# Patient Record
Sex: FEMALE | Race: WHITE | NOT HISPANIC OR LATINO | Employment: FULL TIME | URBAN - METROPOLITAN AREA
[De-identification: names, ages, dates, MRNs, and addresses within clinical notes are randomized per-mention and may not be internally consistent; named-entity substitution may affect disease eponyms.]

---

## 2017-03-23 ENCOUNTER — HOSPITAL ENCOUNTER (OUTPATIENT)
Dept: RADIOLOGY | Facility: HOSPITAL | Age: 37
Discharge: HOME | End: 2017-03-23
Attending: NEUROLOGICAL SURGERY
Payer: COMMERCIAL

## 2017-03-23 DIAGNOSIS — R93.0 ABNORMAL FINDINGS ON DIAGNOSTIC IMAGING OF SKULL AND HEAD, NOT ELSEWHERE CLASSIFIED: ICD-10-CM

## 2017-03-23 PROCEDURE — A9585 GADOBUTROL INJECTION: HCPCS | Performed by: NEUROLOGICAL SURGERY

## 2017-03-23 PROCEDURE — 70553 MRI BRAIN STEM W/O & W/DYE: CPT

## 2017-03-23 RX ADMIN — GADOBUTROL 8 ML: 604.72 INJECTION INTRAVENOUS at 18:52

## 2017-03-28 ENCOUNTER — ALLSCRIPTS OFFICE VISIT (OUTPATIENT)
Dept: OTHER | Facility: OTHER | Age: 37
End: 2017-03-28

## 2017-05-09 ENCOUNTER — APPOINTMENT (EMERGENCY)
Dept: RADIOLOGY | Facility: HOSPITAL | Age: 37
End: 2017-05-09
Payer: COMMERCIAL

## 2017-05-09 ENCOUNTER — HOSPITAL ENCOUNTER (EMERGENCY)
Facility: HOSPITAL | Age: 37
Discharge: HOME | End: 2017-05-09
Attending: EMERGENCY MEDICINE
Payer: COMMERCIAL

## 2017-05-09 VITALS
SYSTOLIC BLOOD PRESSURE: 106 MMHG | TEMPERATURE: 97.3 F | WEIGHT: 200 LBS | BODY MASS INDEX: 29.53 KG/M2 | HEART RATE: 72 BPM | DIASTOLIC BLOOD PRESSURE: 54 MMHG | RESPIRATION RATE: 18 BRPM | OXYGEN SATURATION: 98 %

## 2017-05-09 DIAGNOSIS — Q62.11 HYDRONEPHROSIS WITH URETEROPELVIC JUNCTION (UPJ) OBSTRUCTION: ICD-10-CM

## 2017-05-09 DIAGNOSIS — N20.0 KIDNEY STONE ON RIGHT SIDE: Primary | ICD-10-CM

## 2017-05-09 LAB
ALBUMIN SERPL BCP-MCNC: 4.1 G/DL (ref 3.5–5)
ALP SERPL-CCNC: 56 U/L (ref 46–116)
ALT SERPL W P-5'-P-CCNC: 21 U/L (ref 12–78)
ANION GAP SERPL CALCULATED.3IONS-SCNC: 7 MMOL/L (ref 4–13)
AST SERPL W P-5'-P-CCNC: 17 U/L (ref 5–45)
BACTERIA UR QL AUTO: NORMAL /HPF
BASOPHILS # BLD AUTO: 0.03 THOUSANDS/ΜL (ref 0–0.1)
BASOPHILS NFR BLD AUTO: 0 % (ref 0–1)
BILIRUB SERPL-MCNC: 0.53 MG/DL (ref 0.2–1)
BILIRUB UR QL STRIP: NEGATIVE
BUN SERPL-MCNC: 19 MG/DL (ref 5–25)
CALCIUM SERPL-MCNC: 8.5 MG/DL (ref 8.3–10.1)
CHLORIDE SERPL-SCNC: 104 MMOL/L (ref 100–108)
CLARITY UR: CLEAR
CO2 SERPL-SCNC: 26 MMOL/L (ref 21–32)
COLOR UR: YELLOW
COLOR, POC: NORMAL
CREAT SERPL-MCNC: 0.68 MG/DL (ref 0.6–1.3)
EOSINOPHIL # BLD AUTO: 0.15 THOUSAND/ΜL (ref 0–0.61)
EOSINOPHIL NFR BLD AUTO: 2 % (ref 0–6)
ERYTHROCYTE [DISTWIDTH] IN BLOOD BY AUTOMATED COUNT: 12.7 % (ref 11.6–15.1)
GFR SERPL CREATININE-BSD FRML MDRD: >60 ML/MIN/1.73SQ M
GLUCOSE SERPL-MCNC: 109 MG/DL (ref 65–140)
GLUCOSE UR STRIP-MCNC: NEGATIVE MG/DL
HCG UR QL: NEGATIVE
HCT VFR BLD AUTO: 38.1 % (ref 34.8–46.1)
HGB BLD-MCNC: 12.9 G/DL (ref 11.5–15.4)
HGB UR QL STRIP.AUTO: ABNORMAL
HYALINE CASTS #/AREA URNS LPF: NORMAL /LPF
KETONES UR STRIP-MCNC: NEGATIVE MG/DL
LEUKOCYTE ESTERASE UR QL STRIP: ABNORMAL
LIPASE SERPL-CCNC: 123 U/L (ref 73–393)
LYMPHOCYTES # BLD AUTO: 1.7 THOUSANDS/ΜL (ref 0.6–4.47)
LYMPHOCYTES NFR BLD AUTO: 19 % (ref 14–44)
MCH RBC QN AUTO: 28.4 PG (ref 26.8–34.3)
MCHC RBC AUTO-ENTMCNC: 33.9 G/DL (ref 31.4–37.4)
MCV RBC AUTO: 84 FL (ref 82–98)
MONOCYTES # BLD AUTO: 0.45 THOUSAND/ΜL (ref 0.17–1.22)
MONOCYTES NFR BLD AUTO: 5 % (ref 4–12)
NEUTROPHILS # BLD AUTO: 6.57 THOUSANDS/ΜL (ref 1.85–7.62)
NEUTS SEG NFR BLD AUTO: 74 % (ref 43–75)
NITRITE UR QL STRIP: NEGATIVE
NON-SQ EPI CELLS URNS QL MICRO: NORMAL /HPF
NRBC BLD AUTO-RTO: 0 /100 WBCS
PH UR STRIP.AUTO: 6 [PH] (ref 4.5–8)
PLATELET # BLD AUTO: 246 THOUSANDS/UL (ref 149–390)
PMV BLD AUTO: 9.1 FL (ref 8.9–12.7)
POTASSIUM SERPL-SCNC: 3.5 MMOL/L (ref 3.5–5.3)
PROT SERPL-MCNC: 7.3 G/DL (ref 6.4–8.2)
PROT UR STRIP-MCNC: NEGATIVE MG/DL
RBC # BLD AUTO: 4.54 MILLION/UL (ref 3.81–5.12)
RBC #/AREA URNS AUTO: NORMAL /HPF
SODIUM SERPL-SCNC: 137 MMOL/L (ref 136–145)
SP GR UR STRIP.AUTO: 1.01 (ref 1–1.03)
UROBILINOGEN UR QL STRIP.AUTO: 0.2 E.U./DL
WBC # BLD AUTO: 8.91 THOUSAND/UL (ref 4.31–10.16)
WBC #/AREA URNS AUTO: NORMAL /HPF

## 2017-05-09 PROCEDURE — 81001 URINALYSIS AUTO W/SCOPE: CPT

## 2017-05-09 PROCEDURE — 96374 THER/PROPH/DIAG INJ IV PUSH: CPT

## 2017-05-09 PROCEDURE — 87086 URINE CULTURE/COLONY COUNT: CPT

## 2017-05-09 PROCEDURE — 85025 COMPLETE CBC W/AUTO DIFF WBC: CPT | Performed by: EMERGENCY MEDICINE

## 2017-05-09 PROCEDURE — 36415 COLL VENOUS BLD VENIPUNCTURE: CPT | Performed by: EMERGENCY MEDICINE

## 2017-05-09 PROCEDURE — 81002 URINALYSIS NONAUTO W/O SCOPE: CPT | Performed by: EMERGENCY MEDICINE

## 2017-05-09 PROCEDURE — 83690 ASSAY OF LIPASE: CPT | Performed by: EMERGENCY MEDICINE

## 2017-05-09 PROCEDURE — 81025 URINE PREGNANCY TEST: CPT | Performed by: EMERGENCY MEDICINE

## 2017-05-09 PROCEDURE — 80053 COMPREHEN METABOLIC PANEL: CPT | Performed by: EMERGENCY MEDICINE

## 2017-05-09 PROCEDURE — 74176 CT ABD & PELVIS W/O CONTRAST: CPT

## 2017-05-09 PROCEDURE — 99284 EMERGENCY DEPT VISIT MOD MDM: CPT

## 2017-05-09 RX ORDER — ONDANSETRON 4 MG/1
4 TABLET, ORALLY DISINTEGRATING ORAL ONCE
Status: COMPLETED | OUTPATIENT
Start: 2017-05-09 | End: 2017-05-09

## 2017-05-09 RX ORDER — OXYCODONE HYDROCHLORIDE AND ACETAMINOPHEN 5; 325 MG/1; MG/1
1 TABLET ORAL EVERY 4 HOURS PRN
Qty: 10 TABLET | Refills: 0 | Status: SHIPPED | OUTPATIENT
Start: 2017-05-09 | End: 2017-05-19

## 2017-05-09 RX ORDER — KETOROLAC TROMETHAMINE 30 MG/ML
15 INJECTION, SOLUTION INTRAMUSCULAR; INTRAVENOUS ONCE
Status: COMPLETED | OUTPATIENT
Start: 2017-05-09 | End: 2017-05-09

## 2017-05-09 RX ORDER — NAPROXEN 500 MG/1
500 TABLET ORAL 2 TIMES DAILY WITH MEALS
Qty: 60 TABLET | Refills: 0 | Status: SHIPPED | OUTPATIENT
Start: 2017-05-09 | End: 2018-01-31

## 2017-05-09 RX ORDER — TAMSULOSIN HYDROCHLORIDE 0.4 MG/1
0.4 CAPSULE ORAL
Qty: 5 CAPSULE | Refills: 0 | Status: SHIPPED | OUTPATIENT
Start: 2017-05-09 | End: 2018-01-31

## 2017-05-09 RX ADMIN — KETOROLAC TROMETHAMINE 15 MG: 30 INJECTION, SOLUTION INTRAMUSCULAR at 01:52

## 2017-05-09 RX ADMIN — ONDANSETRON 4 MG: 4 TABLET, ORALLY DISINTEGRATING ORAL at 01:52

## 2017-05-10 LAB — BACTERIA UR CULT: NORMAL

## 2017-08-25 ENCOUNTER — ALLSCRIPTS OFFICE VISIT (OUTPATIENT)
Dept: OTHER | Facility: OTHER | Age: 37
End: 2017-08-25

## 2017-08-25 DIAGNOSIS — Z13.6 ENCOUNTER FOR SCREENING FOR CARDIOVASCULAR DISORDERS: ICD-10-CM

## 2017-08-25 DIAGNOSIS — Z00.00 ENCOUNTER FOR GENERAL ADULT MEDICAL EXAMINATION WITHOUT ABNORMAL FINDINGS: ICD-10-CM

## 2018-01-10 NOTE — RESULT NOTES
Message   Rashad Stacy please have the patient follow-up with me to discuss the MRI of the brain report in the next 1-2 days         Verified Results  * MRI BRAIN W WO CONTRAST [id number] 09:01PM Dayanara Stacy     Test Name Result Flag Reference   MRI BRAIN W [address] [employer] (Report)     This is a summary report  The complete report is available in the patient's medical record  If you cannot access the medical record, please contact the sending organization for a detailed fax or copy  MRI BRAIN WITH AND WITHOUT CONTRAST     INDICATION: Headaches  COMPARISON: 8/10/2016  TECHNIQUE:   Sagittal T1, axial T2, axial FLAIR, axial T1, axial [place], axial diffusion  Sagittal, axial T1 postcontrast  Axial BRAVO post contrast     8 mL of Gadavist was injected intravenously without immediate consequence  IMAGE QUALITY:  Diagnostic  FINDINGS:     BRAIN PARENCHYMA: Once again identified is a faint T2 and FLAIR hyperintensity within the left posterior lateral frontal white matter, series 5 image 19 unchanged from the examination performed one week ago without contrast  No associated abnormal    signal on gradient imaging  Postcontrast imaging is unremarkable as well  Normal corpus callosum  No other white matter lesions are identified  Normal basilar cisterns  Brainstem and cerebellum are unremarkable  VENTRICLES: Normal      SELLA AND PITUITARY GLAND: Normal      ORBITS: Normal      PARANASAL SINUSES: Moderate mucosal thickening of the maxillary sinuses, ethmoid air cells and sphenoid sinus  No change  VASCULATURE: Evaluation of the major intracranial vasculature demonstrates appropriate flow voids  CALVARIUM AND SKULL BASE: Normal      EXTRACRANIAL SOFT TISSUES: Normal        IMPRESSION:       1   The previously described solitary white matter lesion within the left posterior lateral frontal lobe demonstrates no abnormal enhancement after administration of contrast  There is no diffusion abnormality or associated abnormal gradient signal     Findings are nonspecific for a patient of this age and differential possibilities would include solitary focus of demyelinating disease  Low-grade neoplastic process not completely excluded  Consider MRI of the spinal cord to evaluate for additional    demyelinating lesions  Short-term follow-up examination in 3-6 months recommended to confirm stability  ##imslh##imslh     ##fuslh6##fuslh6       Workstation performed: [id number]     Signed by:   Kwadwo Lozano DO   8/29/16       Plan  Abnormal MRI of head    · * MRI BRAIN W WO CONTRAST; Status:Active;  Requested for:58Rvd2558;     Signatures   Electronically signed by : Olena Graf DO; Aug 29 2016  7:58PM EST                       (Author)

## 2018-01-11 NOTE — PROGRESS NOTES
Assessment    1  Encounter for preventive health examination (V70 0) (Z00 00)   2  Anxiety (300 00) (F41 9)    Assessment and plan #1 annual wellness examination completed  Patient overall the patient is clinically stable and doing well, I have recommended that the patient follow a healthy and balanced diet, exercise routinely, recommend weight loss  We encouraged a flu vaccine in the fall and continue to see the OB/GYN for routine examination  I have ordered a comprehensive metabolic panel, lipid panel fasting  #2 anxiety no suicidal ideation I have refilled the lorazepam 0 5 mg 1 tablet once a day as needed no alcohol or driving we did check the PDMP, also I will start the patient on Lexapro 5 mg 1 tablet per day I have reviewed the risks benefits and side effects of the medication I have recommended the patient consider counseling she will let me know in the future is interested RTO in 4-6 weeks call if any problems  Plan  Anxiety    · (Q) TSH, 3RD GENERATION; Status:Active; Requested for:80Bjg7197;   Claustrophobia    · LORazepam 0 5 MG Oral Tablet (Ativan); TAKE 1 TABLET DAILY AS NEEDED  Health Maintenance    · Lexapro 5 MG Oral Tablet (Escitalopram Oxalate); TAKE 1 TABLET DAILY   · (1) COMPREHENSIVE METABOLIC PANEL; Status:Active; Requested for:48Dek0148; Health Maintenance, Encounter for screening for cardiovascular disorders    · (1) LIPID PANEL, FASTING; Status:Active; Requested for:51Ylq2419;     Discussion/Summary  healthy adult female Currently, she eats a healthy diet and has an adequate exercise regimen  the risks and benefits of cervical cancer screening were discussed cervical cancer screening is current Breast cancer screening: monthly self breast exam was advised  Colorectal cancer screening: colorectal cancer screening is not indicated  Osteoporosis screening: bone mineral density testing is not indicated  Screening lab work includes glucose, lipid profile and thyroid function testing   The risks and benefits of immunizations were discussed and Td today, flu shot in the fall  Advice and education were given regarding nutrition and weight loss  Patient discussion: discussed with the patient  History of Present Illness  HM, Adult Female: The patient is being seen for a health maintenance evaluation  Social History: Household members include spouse, 1 daughter(s) and 1 son(s)  She is   Work status: working full time and occupation: executive   The patient has never smoked cigarettes  She reports rare alcohol use  She has never used illicit drugs  General Health: The patient's health since the last visit is described as good  She has regular dental visits  The patient brushes 1 time(s) a day, flosses 1 time(s) a day and reports her last dental visit was 6/2017  She complains of vision problems  Vision care includes wearing glasses, wearing soft contact lenses and an eye examination more than a year ago  She denies hearing loss  Immunizations status: up to date  Lifestyle:  She consumes a diverse and healthy diet  Dietary details include 60-70 ounces of water per day, 1 cups of coffee per day, 0 cans of regular soda per day and cans of diet soda per day  She has weight concerns  She exercises regularly  She exercises 4 times per week for 25-30 minutes per session  Exercise includes aerobic conditioning and strength training  She does not use tobacco  The patient has never smoked cigarettes, has never used smokeless tobacco, has never smoked a pipe and has never smoked cigars  She consumes alcohol  She reports occasional alcohol use and drinking 1 every 12 weeks drinks per week  She typically drinks wine and hard liquor, but no beer consumption  Alcohol concern: The patient has no concerns about alcohol abuse  She denies drug use  She has never used illicit drugs  Reproductive health:  she reports normal menses  she uses contraception  she is sexually active     Screening: Cervical cancer screening includes a pap smear performed last year  Breast cancer screening includes no previous mammogram      Safety elements used: seat belt, safe driving habits, sunscreen, smoke detector and carbon monoxide detector  Risk findings: no passive smoke exposure, no guns at home, no travel to developing areas and no tuberculosis exposure  Anxiety Disorder, NOS (Brief): The patient is being seen for an initial evaluation of anxiety  Symptoms:  anxiety, difficulty concentrating, excessive worry, fatigue, irritability, globus, muscle tension, nervousness and sleep disruption, but no panic attacks, no shaky hands and no sweaty palms  Associated symptoms:  flushing, muscle pain and muscle spasms, but no chest pain, no choking sensation, no diaphoresis, no dizziness, no fainting, no heart palpitations, no racing heart, no hyperventilation, no paresthesia and no tremors  HPI: always on edge , feels always at that point going to crack , immediately jumping on the kids,      Active Problems    1  Abnormal MRI of head (793 0) (R93 0)   2  Abnormal weight gain (783 1) (R63 5)   3  Acute streptococcal pharyngitis (034 0) (J02 0)   4  Anxiety (300 00) (F41 9)   5  Asthma (493 90) (J45 909)   6  Brain lesion (348 89) (G93 9)   7  Chest pain (786 50) (R07 9)   8  Claustrophobia (300 29) (F40 240)   9  Dermatitis (692 9) (L30 9)   10  Encounter for gynecological examination without abnormal finding (V72 31) (Z01 419)   11  Encounter for routine checking of intrauterine contraceptive device (V25 42) (Z30 431)   12  Encounter for routine gynecological examination (V72 31) (Z01 419)   13  Encounter for screening for cardiovascular disorders (V81 2) (Z13 6)   14  Fatigue (780 79) (R53 83)   15  Headache (784 0) (R51)   16  Idiopathic urticaria (708 1) (L50 1)   17  Purpura (287 2) (D69 2)   18  Screening for human papillomavirus (HPV) (V73 81) (Z11 51)   19  Seasonal allergic rhinitis (477 9) (J30 2)   20  Tachycardia (785 0) (R00 0)   21  Weight gain (783 1) (R63 5)    Past Medical History    · History of Age At First Period 16 Years Old (Menarche)   · Asthma (493 90) (J45 909)   · History of Blood Type Rh Negative   · History of Currently Nursing (V24 1)   · Encounter for routine checking of intrauterine contraceptive device (V25 42) (Z30 431)   · History of sciatica (V12 49) (Z86 69)   · History of shortness of breath (V13 89) (Z87 898)   · Normal delivery (650) (O80,Z37  9)   · History of Palpitations (785 1) (R00 2)   · Personal history of supraventricular tachycardia (V12 59) (Z86 79)   · History of Spontaneous , complete, without mention of complication (066 96)  (M80 2)    Surgical History    · History of Catheter Ablation Atrial Supraventricular Tachycardia   · History of Cholecystotomy   · History of Oral Surgery Tooth Extraction    Family History  Mother    · Family history of Diabetes Mellitus (V18 0)   · Family history of Hypothyroidism  Father    · Family history of Diabetes Mellitus (V18 0)   · Family history of malignant neoplasm of kidney (V16 51) (Z80 51)   · Family history of Hyperthyroidism  Maternal Great Grandmother    · Family history of cerebrovascular accident (CVA) (V17 1) (Z82 3)  Maternal Grandfather    · Family history of Adenocarcinoma Of The Skin   · Family history of Prostate Cancer (V16 42)  Paternal Grandfather    · Family history of myocardial infarction (V17 3) (Z82 49)    Social History    · Always uses seat belt   · History of Birth Control Method - Condoms   · Denied: History of Drug Use   · Marital History - Currently    · Never a smoker   · Never Drank Alcohol   · Non-smoker (V49 89) (Z78 9)   · Occupation:    · Presence of (intrauterine) contraceptive device (V45 51) (Z97 5)    Current Meds   1  Cetirizine HCl - 10 MG Oral Tablet; TAKE 1 TABLET DAILY AS DIRECTED; Therapy: (Isra Baeza) to Recorded   2   LORazepam 0 5 MG Oral Tablet; TAKE 1 TABLET DAILY AS NEEDED; Therapy: 19Wlk1273 to (Evaluate:02Sep2016); Last Rx:01Sep2016 Ordered   3  Ventolin  (90 Base) MCG/ACT Inhalation Aerosol Solution; INHALE 1 TO 2   PUFFS EVERY 4 TO 6 HOURS AS NEEDED; Therapy: 15SXA1921 to (Last Rx:33Xir7808)  Requested for: 19Irx2612 Ordered    Allergies    1  Penicillins    2  Animal dander - Cats   3  Dust   4  Mold    Vitals   Recorded: 25Aug2017 09:16AM   Heart Rate 72   Respiration 16   Systolic 375, LUE, Sitting   Diastolic 68, LUE, Sitting   BP CUFF SIZE Large   Height 5 ft 8 in   Weight 200 lb 0 6 oz   BMI Calculated 30 42   BSA Calculated 2 04     Physical Exam    Constitutional   General appearance: No acute distress, well appearing and well nourished  Head and Face   Head and face: Normal     Eyes   Conjunctiva and lids: No swelling, erythema or discharge  Pupils and irises: Equal, round, reactive to light  Ears, Nose, Mouth, and Throat   External inspection of ears and nose: Normal     Otoscopic examination: Tympanic membranes translucent with normal light reflex  Canals patent without erythema  Hearing: Normal     Nasal mucosa, septum, and turbinates: Normal without edema or erythema  Lips, teeth, and gums: Normal, good dentition  Oropharynx: Normal with no erythema, edema, exudate or lesions  Neck   Neck: Supple, symmetric, trachea midline, no masses  Thyroid: Normal, no thyromegaly  Pulmonary   Respiratory effort: No increased work of breathing or signs of respiratory distress  Auscultation of lungs: Clear to auscultation  Cardiovascular   Auscultation of heart: Normal rate and rhythm, normal S1 and S2, no murmurs  Examination of extremities for edema and/or varicosities: Normal     Abdomen   Abdomen: Non-tender, no masses  Liver and spleen: No hepatomegaly or splenomegaly  Lymphatic   Palpation of lymph nodes in neck: No lymphadenopathy      Psychiatric   Mood and affect: Normal        Results/Data  PHQ-2 Adult Depression Screening 22Ane3994 09:24AM User, Ahs     Test Name Result Flag Reference   PHQ-2 Adult Depression Score 0     Over the last two weeks, how often have you been bothered by any of the following problems? Little interest or pleasure in doing things: Not at all - 0  Feeling down, depressed, or hopeless: Not at all - 0   PHQ-2 Adult Depression Screening Negative         Health Management  Asthma   SPIROMETRY W/ BRONCHO- POC; every 1 year; Next Due: 58Gxq5388; Overdue  Screening for human papillomavirus (HPV)   (1) THIN PREP PAP FOLLOW UP WITH IMAGING; every 3 years; Last 05RTM0476; Next  Due: 50ILI0098;  Overdue    Future Appointments    Date/Time Provider Specialty Site   12/13/2017 11:20 AM Cm Martin DO Obstetrics/Gynecology [facility]   11/09/2017 09:30 AM Pablo Fry DO Internal Medicine [facility]     Signatures   Electronically signed by : Nohemi Garsia DO; Aug 27 2017  8:58AM EST                       (Author)

## 2018-01-12 NOTE — RESULT NOTES
Message   Notify the patient the MRI of the cervical spine does show a small left paracentral C5-6 disc herniation with mild canal stenosis but no cord compression or nerve impingement please have the patient follow-up with me to discuss        Verified Results  * MRI CERVICAL SPINE W WO CONTRAST [id number] 04:36PM Sierra Ansari Order Number: [id number]    - Patient Instructions: To schedule this appointment, please contact Central Scheduling at [phone]  Test Name Result Flag Reference   MRI CERVICAL SPINE W [address] (Report)     MRI CERVICAL SPINE WITH AND WITHOUT CONTRAST     INDICATION: Possible demyelinating disease  COMPARISON: None  TECHNIQUE: Sagittal T1, sagittal T2, sagittal inversion recovery, axial 2D merge and axial T2  Sagittal T1 and axial T1 postcontrast     8 mL of Gadavist was administered without immediate consequence  IMAGE QUALITY: Diagnostic  FINDINGS:     ALIGNMENT: Normal alignment of the cervical spine  No compression fracture  No subluxation  No scoliosis  MARROW SIGNAL: Normal marrow signal is identified within the visualized bony structures  No discrete marrow lesion  CERVICAL AND VISUALIZED UPPER THORACIC CORD: Normal signal within the visualized cord  PREVERTEBRAL AND PARASPINAL SOFT TISSUES: Prevertebral and paraspinal soft tissues are unremarkable  VISUALIZED POSTERIOR FOSSA: The visualized posterior fossa demonstrates no abnormal signal      CERVICAL DISC SPACES:        C2-C3: Normal      C3-C4: Normal      C4-C5: Normal      C5-C6: There is a very small left paracentral disc protrusion abutting the ventral aspect of the thecal sac and mildly effacing the CSF space  No cord compression  Mild canal stenosis without foraminal narrowing       C6-C7: Normal      C7-T1: Normal      UPPER THORACIC DISC SPACES: Normal      POSTCONTRAST IMAGING: Normal        IMPRESSION:     Small left paracentral C5-6 disc herniation with mild canal stenosis but no cord compression or discrete nerve impingement         Workstation performed: [id number]     Signed by:   Dariusz Gamez DO   9/16/16       Signatures   Electronically signed by : Ifeoma Fenton DO; Sep 17 2016  9:15AM EST                       (Author)

## 2018-01-12 NOTE — PSYCH
History of Present Illness  Psychotherapy Provided [facility]: Individual Psychotherapy 40 minutes minutes provided today  Goals addressed in session:   Patient struggling with stress and anxiety secondary to issues with her  and her own Medical status   has been primary stressor and  well known to me  Denies any depressive symptoms  Patient verbal and cooperative  HPI - Psych: Patient denies any depressive symptoms  No SI  Very committed to her family  Has extensive support system via family and peers  Anxious about medical work up taking place but managing  Has been losing patience with marriage and has informed  his behavior has to change to continue relationship   Note   Note:   Provided supportive therapy  Reviewed strategies to continue to apply with  for him to take more ownership for his behavior  Reviewed stress mgmt strategies to utilize  Offered additional sessions moving forward  Assessment    1   Anxiety (300 00) (F41 9)    Signatures   Electronically signed by : Jina Zapien LCSW; Aug 31 2016  7:22PM EST                       (Author)

## 2018-01-12 NOTE — RESULT NOTES
Message   Notify the patient the ultrasound of the thyroid showed scattered subcentimeter colloid cyst please have the patient follow up with me as scheduled to discuss the report        Verified Results  US THYROID 10Aug2016 08:45AM Cassie Gay Order Number: [id number]     Test Name Result Flag Reference   US THYROID (Report)     THYROID ULTRASOUND     INDICATION: Abnormal weight gain     COMPARISON: None  TECHNIQUE:  Ultrasound of the thyroid was performed with a high frequency linear transducer in transverse and sagittal planes including volumetric imaging sweeps as well as traditional still imaging technique  FINDINGS:   Right gland: 5 1 x 1 0 x 1 8 cm  Volume = 4 4 mL  Parenchymal echotexture is mildly heterogeneous  Upper pole heterogeneous hypoechoic colloid cyst measuring 0 4 cm  Left gland: 5 7 x 1 2 x 2 0 cm  Volume = 6 5 mL  Parenchymal echotexture is mildly heterogeneous  No dominant nodules  Isthmus: 0 5 cm in AP dimension  No dominant nodules  IMPRESSION:   Normal size thyroid gland with scattered subcentimeter colloid cysts as detailed above, the largest measures 0 4 cm in the upper pole of the right thyroid lobe          Workstation performed: [id number]     Signed by:    Gina Gosselin, DO   8/10/16       Signatures   Electronically signed by : Lucien Szymanski DO; Aug 10 2016  7:37PM EST                       (Author)

## 2018-01-14 VITALS
BODY MASS INDEX: 30.32 KG/M2 | SYSTOLIC BLOOD PRESSURE: 104 MMHG | RESPIRATION RATE: 16 BRPM | WEIGHT: 200.04 LBS | HEART RATE: 72 BPM | DIASTOLIC BLOOD PRESSURE: 68 MMHG | HEIGHT: 68 IN

## 2018-01-15 NOTE — PROGRESS NOTES
Assessment    1  Abnormal MRI of head (793 0) (R93 0)    Plan    · * MRI BRAIN W WO CONTRAST; Status:Need Information - Financial Authorization; Requested for:28Mar2017;    Perform:[facility]; 9961 169; Ordered; For:Abnormal MRI of head; Ordered By:Tony Chopra;   · Follow Up in 2 Years Evaluation and Treatment  Follow-up  Status: Hold For - Scheduling   Requested for: 22JHT3829   Ordered; For: Abnormal MRI of head; Ordered By: Mary Deras Performed:  Due: 02Apr2017    Discussion/Summary    Unchanged abnormality in the high frontal lobe    Neoplasm is less likely given the fact that it has not changed over time  The patient remains asymptomatic  I've recommended continued follow-up in agreement with the radiologist albeit less frequently  Chief Complaint  Patient presents for 6 month folow up with MRI Brain for review  Dx: Abnormal MRI of head       History of Present Illness  Patient is a 29y old female referred to our office by Internal Medicine, Dr Minda Carlton, for evaluation with an abnormal brain MRI  Patient was complaining of headache and an MRI wo contrast was recommended  MRI of the brain was abnormal with concerned of gliosis  A repeat MRI was recommended to rule out tumor, and rule out MS  After reviewing second MRI Dr Rudolph Bruner recommended neurosurgical evaluation  On 9/29/16, patient presented with a 1 month history of headache following a very stressful circumstance in her personal life  She does have a history of rare but severe headaches  It is very likely that this represents the residual of a severe migrainous effect  Alternatively a low-grade tumor or a demyelinating plaque such as is seen and multiple sclerosis could have a similar appearance  These are much less likely given her personal circumstances  In an abundance of caution repeating the study in 6 months time will allow us to exclude further the possibility of an expanding tumor      Today, she offers no complaints at this time  Active Problems    1  Abnormal MRI of head (793 0) (R93 0)   2  Abnormal weight gain (783 1) (R63 5)   3  Acute streptococcal pharyngitis (034 0) (J02 0)   4  Anxiety (300 00) (F41 9)   5  Asthma (493 90) (J45 909)   6  Brain lesion (348 89) (G93 9)   7  Chest pain (786 50) (R07 9)   8  Claustrophobia (300 29) (F40 240)   9  Dermatitis (692 9) (L30 9)   10  Encounter for gynecological examination without abnormal finding (V72 31) (Z01 419)   11  Encounter for routine checking of intrauterine contraceptive device (V25 42) (Z30 431)   12  Encounter for routine gynecological examination (V72 31) (Z01 419)   13  Encounter for screening for cardiovascular disorders (V81 2) (Z13 6)   14  Fatigue (780 79) (R53 83)   15  Headache (784 0) (R51)   16  Idiopathic urticaria (708 1) (L50 1)   17  Purpura (287 2) (D69 2)   18  Screening for human papillomavirus (HPV) (V73 81) (Z11 51)   19  Tachycardia (785 0) (R00 0)   20  Weight gain (783 1) (R63 5)    Past Medical History    1  History of Age At First Period 16 Years Old (Menarche)   2  Asthma (493 90) (J45 909)   3  History of Blood Type Rh Negative   4  History of Currently Nursing (V24 1)   5  Encounter for routine checking of intrauterine contraceptive device (V25 42) (Z30 431)   6  History of sciatica (V12 49) (Z86 69)   7  History of seasonal allergies (V15 09) (Z88 9)   8  History of shortness of breath (V13 89) (Z87 898)   9  Normal delivery (650) (O80,Z37 9)   10  History of Palpitations (785 1) (R00 2)   11  Personal history of supraventricular tachycardia (V12 59) (Z86 79)   12  History of Spontaneous , complete, without mention of complication (319 38)    (O03 9)    The active problems and past medical history were reviewed and updated today  Surgical History    1  History of Catheter Ablation Atrial Supraventricular Tachycardia   2  History of Cholecystotomy   3   History of Oral Surgery Tooth Extraction    The surgical history was reviewed and updated today  Family History  Mother    1  Family history of Diabetes Mellitus (V18 0)   2  Family history of Hypothyroidism  Father    3  Family history of Diabetes Mellitus (V18 0)   4  Family history of malignant neoplasm of kidney (V16 51) (Z80 51)   5  Family history of Hyperthyroidism  Maternal Great Grandmother    6  Family history of cerebrovascular accident (CVA) (V17 1) (Z82 3)  Maternal Grandfather    7  Family history of Adenocarcinoma Of The Skin   8  Family history of Prostate Cancer (A72 04)  Paternal Grandfather    5  Family history of myocardial infarction (V17 3) (Z82 49)    The family history was reviewed and updated today  Social History    · Always uses seat belt   · History of Birth Control Method - Condoms   · Denied: History of Drug Use   · Marital History - Currently    · Never a smoker   · Never Drank Alcohol   · Non-smoker (V49 89) (Z78 9)   · Occupation:    · Presence of (intrauterine) contraceptive device (V45 51) (Z97 5)  The social history was reviewed and updated today  Current Meds   1  LORazepam 0 5 MG Oral Tablet; TAKE 1 TABLET DAILY AS NEEDED; Therapy: 20Aso6747 to (Evaluate:89Xwt3817); Last Rx:61Sdl9083 Ordered   2  Ventolin  (90 Base) MCG/ACT Inhalation Aerosol Solution; INHALE 1 TO 2 PUFFS   EVERY 4 TO 6 HOURS AS NEEDED; Therapy: 40QQN2217 to (Last Rx:97Ydy8623)  Requested for: 68Xpm5859 Ordered   3  Zyrtec 10 MG TABS; TAKE 1 TABLET DAILY AS DIRECTED; Therapy: (Recorded:89Vea5409) to Recorded    The medication list was reviewed and updated today  Allergies    1  Penicillins    2  Animal dander - Cats   3  Dust   4   Mold    Vitals  Vital Signs    Recorded: 41HWL4985 02:12PM   Temperature 98 F   Heart Rate 76   Respiration 16   Systolic 380   Diastolic 73   Height 5 ft 8 in   Weight 198 lb    BMI Calculated 30 11   BSA Calculated 2 04   Pain Scale 0     Physical Exam     Mental Status: Alert and Oriented x3  Memory is intact  Attentive  Speech is articulate and fluent  Knowledge and vocabulary consistent with education  Grossly nonfocal     Cranial Nerve Exam:  2nd cranial nerve: Normal with no noted deficit  3rd, 4th, and 6th cranial nerves: PERRLA and EOMI without later gaze nystagmus  5th cranial nerve: Normal with no noted deficit  7th cranial nerve: Face symmetrical at grimace and at rest   8th cranial nerve: Grossly intact to finger rub bilaterally  11th cranial nerve: Shoulder shrug equal bilaterally  Motor System - Upper Extremities: Muscle strength: 5/5 bilaterally  Muscle tone: Normal bilaterally  Muscle Bulk: Normal Muscle bulk bilaterally  Motor System - Lower Extremities: Muscle strength: 5/5 bilaterally  Muscle tone: Normal bilaterally  Muscle Bulk: Normal Muscle bulk bilaterally  Coordination: Coordination: Finger to nose was normal, heel to knee to shin was normal able to perform rapid alternating movements well bilaterally  Involuntary movements: None   Sensory: Sensation:Sensation grossly intact to light tough and pinprick  Gait and Station: Gait and station: Lakeisha with a normal gait and station  Tandem walk is normal, Heel walk and toe walk intact and without sign of paresis  Constitutional General appearance: No acute distress, well appearing and well nourished  Results/Data  Diagnostic Studies Reviewed Neurosurger [facility]:   I personally reviewed the MRI of the brain in detail with the patient  MRI Review MRI of the brain is reviewed  This demonstrates no changes in the size of a FLAIR sequence abnormality in the high frontal coronal radiata  Health Management  Asthma   SPIROMETRY W/ BRONCHO- POC; every 1 year; Next Due: 95Gsb2554; Overdue  Screening for human papillomavirus (HPV)   (1) THIN PREP PAP FOLLOW UP WITH IMAGING; every 3 years; Last 30WQK1628; Next Due:  16POL0344;  Overdue    Future Appointments    Date/Time Provider Specialty Site   12/13/2017 11:20 AM Karen Wellington DO Obstetrics/Gynecology [facility]   05/11/2017 04:00 PM Deepak Steele DO Internal Medicine [facility]     Signatures   Electronically signed by : ANASTASIIA Carreno ; Mar 28 2017  3:03PM EST                       (Author)

## 2018-01-15 NOTE — RESULT NOTES
Message   Notify the patient normal Lyme Western blot follow up as scheduled        Verified Results  (1) LYME ANTIBODY, WESTERN BLOT 97Esl1201 01:11PM Author Credit Order Number: [id number]     Test Name Result Flag Reference   LYME 18 KD IGG Absent     LYME 23 KD IGG Absent     LYME 28 KD IGG Absent     LYME 30 KD IGG Absent     LYME 39 KD IGG Absent     LYME 41 KD IGG Absent     LYME 45 KD IGG Absent     LYME 58 KD IGG Absent     LYME 66 KD IGG Absent     LYME 93 KD IGG Absent     LYME 23 KD IGM Absent     LYME 39 KD IGM Absent     LYME 41 KD IGM Absent     LYME IGG WB INTERP  Negative     Positive: 5 of the following                                 Borrelia-specific bands:                                 18,23,28,30,39,41,45,58,                                 66, and 93  Negative: No bands or banding                                 patterns which do not                                 meet positive criteria  LYME IGM WB INTERP  Negative     Note: An equivocal or positive EIA result followed by a negative  Western Blot result is considered NEGATIVE  An equivocal or positive  EIA result followed by a positive Western Blot is considered POSITIVE  by the CDC  Positive: 2 of the following bands: 23,39 or 41  Negative: No bands or banding patterns which do not meet positive  criteria  Criteria for positivity are those recommended by CDC/ASTPHLD   p23=Osp C, l19=mlcbycizb  Note:  Sera from individuals with the following may cross react in the  Lyme Western Blot assays: other spirochetal diseases (periodontal  disease, leptospirosis, relapsing fever, yaws, and pinta);  connective autoimmune (Rheumatoid Arthritis and Systemic Lupus  Erythematosus and also individuals with Antinuclear Antibody);  other infections [facility] Spotted Fever; Hunter-Barr Virus,  and Cytomegalovirus)      Performed at:  [address] [place] [address]  [id number]  Lab Director: Amari Dodson MD, Phone:  [phone]       Signatures   Electronically signed by : Lucien Szymanski DO; Aug 21 2016  8:31AM EST                       (Author)

## 2018-01-17 ENCOUNTER — ALLSCRIPTS OFFICE VISIT (OUTPATIENT)
Dept: OTHER | Facility: OTHER | Age: 38
End: 2018-01-17

## 2018-01-17 DIAGNOSIS — Z13.6 ENCOUNTER FOR SCREENING FOR CARDIOVASCULAR DISORDERS: ICD-10-CM

## 2018-01-17 DIAGNOSIS — Z00.00 ENCOUNTER FOR GENERAL ADULT MEDICAL EXAMINATION WITHOUT ABNORMAL FINDINGS: ICD-10-CM

## 2018-01-17 NOTE — RESULT NOTES
Message   Notify the patient thyroid antibody panel negative follow up as scheduled        Verified Results  (1) THYROID ANTIBODIES PANEL 10Wlo4770 01:11PM Jesse Curtis Order Number: [id number]     Test Name Result Flag Reference   THYROGLOB AB <1 0 IU/mL  0 0 - 0 9   Thyroglobulin Antibody measured by [facility] Methodology    Performed at:  [facility] [address]  [id number]  : Ilya Avila MD, Phone:  [phone]   [facility] 7 IU/mL  0 - 34   Performed at:  [facility] [address]  [id number]  : Ilya Avila MD, Phone:  [phone]       Signatures   Electronically signed by : Rhianna Linder DO; Aug 22 2016  7:48PM EST                       (Author)

## 2018-01-17 NOTE — RESULT NOTES
Message   Gibson Larose please schedule this patient this week regarding abnormal MRI of the brain *************** she will need another MRI with IV contrast enhancement of the brain, I will order this at the visit        Verified Results  * MRI BRAIN WO CONTRAST [id number] 09:19AM Kary Number     Test Name Result Flag Reference   MRI BRAIN WO CONTRAST (Report)     This is a summary report  The complete report is available in the patient's medical record  If you cannot access the medical record, please contact the sending organization for a detailed fax or copy  MRI BRAIN WITHOUT CONTRAST     INDICATION: 23-year-old female, daily headaches for approximately 2 months   COMPARISON:  None  TECHNIQUE: Sagittal T1, axial T2, axial FLAIR, axial T1, axial Haugen and axial diffusion imaging  IMAGE QUALITY: Diagnostic  FINDINGS:   A solitary focus of faint T2 and FLAIR hyperintensity measuring approximately 9 5 mm is present within the deep white matter at the left frontoparietal junction  Possible etiologies include solitary focus of MS or Lyme disease, chronic gliosis related    to remote insult  Solitary tumor focus not excluded  Further diagnostic specificity should be provided by IV contrast-enhanced MRI  BRAIN PARENCHYMA: There is no mass effect or midline shift  No additional abnormal white matter signal identified  Brainstem and cerebellum demonstrate normal signal  There is no intracranial hemorrhage  There is no evidence of acute infarction and    diffusion imaging is unremarkable  VENTRICLES: The ventricles are normal in size and contour  SELLA AND PITUITARY GLAND: Normal      ORBITS: Normal      PARANASAL SINUSES: Normal      VASCULATURE: Evaluation of the major intracranial vasculature demonstrates appropriate flow voids       CALVARIUM AND SKULL BASE: Normal      EXTRACRANIAL SOFT TISSUES: Normal        IMPRESSION:   Approximately 9 5 mm left frontoparietal junction deep white matter T2 and FLAIR hyperintense focus  Chronic gliosis related to remote insult, MS, Lyme disease or solitary tumor focus considered possible etiologies   Further evaluation via IV    contrast-enhanced MRI recommended       No additional intracranial abnormalities identified       ##imslh##imslh       Workstation performed: [id number]     Signed by:   Marry Tamayo MD   8/10/16       Signatures   Electronically signed by : Massimo Crisostomo DO; Aug 10 2016  5:33PM EST                       (Author)

## 2018-01-22 VITALS
SYSTOLIC BLOOD PRESSURE: 131 MMHG | BODY MASS INDEX: 30.01 KG/M2 | DIASTOLIC BLOOD PRESSURE: 73 MMHG | RESPIRATION RATE: 16 BRPM | WEIGHT: 198 LBS | TEMPERATURE: 98 F | HEART RATE: 76 BPM | HEIGHT: 68 IN

## 2018-01-22 NOTE — PROGRESS NOTES
Assessment   1  Anxiety (300 00) (F41 9)   2  Brain lesion (348 89) (G93 9)   3  Weight gain (783 1) (R63 5)   4  Obesity (278 00) (E66 9)      Assessment and plan 1  Anxiety no suicidal ideation I have refilled the patient's Lexapro 10 mg once a day, I have counseled patient, I will have the patient meet with Maricruz Keita is seeking 2  Brain lesion currently stable reviewed the MRI, reviewed neurosurgery letter currently the patient is stable MRIs are being monitored through neurosurgery 3  Obesity/weight gain secondary to diet I have counseled patient at length about following healthy imbalance diet reducing carbohydrates and sweets  Because of her anxiety she has been over eating  4   Asthma currently stable p r n  Ventolin Return to office 4-6 months  call if any problems       Plan   Health Maintenance    · Escitalopram Oxalate 10 MG Oral Tablet; Take 1 tablet daily   · (1) COMPREHENSIVE METABOLIC PANEL; Status:Active; Requested QID:92JFO1028; Health Maintenance, Encounter for screening for cardiovascular disorders    · (1) LIPID PANEL, FASTING; Status:Active; Requested FTF:53TUU5952;     Chief Complaint   Chief Complaint Chronic Condition [facility] [place]: Patient is here today for follow up of chronic conditions described in HPI  History of Present Illness   HPI: [age]-year old female coming in for a follow up office visit regarding abnormal brain MRI, asthma, obesity, in anxiety; the patient reports me that reports the lexapro is taking the edge anxiety and depression ; at home with  angry, the pt reports anxiety because of the situation depressed because of the anx; eating carb no si no hi the patient does report me that her  has bipolar disorder he is under the care of Psychiatry but she feels he is overmedicated, he is like a walking is on via home she has not been going to the psychiatric appointments with her    Because of this she feels very anxious and depressed because his situation at home  She is planning to go to a visit with her   She reports me over eating and eating a wrong foods because of the stress at home  She reports me that work is not stressful and that she enjoys going to work  She reports me that after her  takes the p m  meds he can't communicate or walk at times, 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      Review of Systems   Complete-Female:      Constitutional: No fever, no chills, feels well, no tiredness, no recent weight gain or weight loss  Cardiovascular: No complaints of slow heart rate, no fast heart rate, no chest pain, no palpitations, no leg claudication, no lower extremity edema  Respiratory: No complaints of shortness of breath, no wheezing, no cough, no SOB on exertion, no orthopnea, no PND  Gastrointestinal: No complaints of abdominal pain, no constipation, no nausea or vomiting, no diarrhea, no bloody stools  Genitourinary: No complaints of dysuria, no incontinence, no pelvic pain, no dysmenorrhea, no vaginal discharge or bleeding  ROS Reviewed:    ROS reviewed  Active Problems   1  Abnormal MRI of head (793 0) (R93 0)   2  Abnormal weight gain (783 1) (R63 5)   3  Acute streptococcal pharyngitis (034 0) (J02 0)   4  Anxiety (300 00) (F41 9)   5  Asthma (493 90) (J45 909)   6  Brain lesion (348 89) (G93 9)   7  Chest pain (786 50) (R07 9)   8  Claustrophobia (300 29) (F40 240)   9  Dermatitis (692 9) (L30 9)   10  Encounter for gynecological examination without abnormal finding (V72 31) (Z01 419)   11  Encounter for routine checking of intrauterine contraceptive device (V25 42) (Z30 431)   12  Encounter for routine gynecological examination (V72 31) (Z01 419)   13  Encounter for screening for cardiovascular disorders (V81 2) (Z13 6)   14  Fatigue (780 79) (R53 83)   15  Headache (784 0) (R51)   16  Idiopathic urticaria (708 1) (L50 1)   17  Need for TD vaccine (V06 5) (Z23)   18   Purpura (287  2) (D69 2)   19  Screening for human papillomavirus (HPV) (V73 81) (Z11 51)   20  Seasonal allergic rhinitis (477 9) (J30 2)   21  Tachycardia (785 0) (R00 0)   22  Weight gain (783 1) (R63 5)    Past Medical History   1  History of Age At First Period 16 Years Old (Menarche)   2  Asthma (493 90) (J45 909)   3  History of Blood Type Rh Negative   4  History of Currently Nursing (V24 1)   5  Encounter for routine checking of intrauterine contraceptive device (V25 42) (Z30 431)   6  History of sciatica (V12 49) (Z86 69)   7  History of shortness of breath (V13 89) (Z87 898)   8  Normal delivery (650) (O80,Z37 9)   9  History of Palpitations (785 1) (R00 2)   10  Personal history of supraventricular tachycardia (V12 59) (Z86 79)   11  History of Spontaneous , complete, without mention of complication (234 79) (B16 8)  Active Problems And Past Medical History Reviewed: The active problems and past medical history were reviewed and updated today  Surgical History   1  History of Catheter Ablation Atrial Supraventricular Tachycardia   2  History of Cholecystotomy   3  History of Oral Surgery Tooth Extraction  Surgical History Reviewed: The surgical history was reviewed and updated today  Family History   Mother    1  Family history of Diabetes Mellitus (V18 0)   2  Family history of Hypothyroidism  Father    3  Family history of Diabetes Mellitus (V18 0)   4  Family history of malignant neoplasm of kidney (V16 51) (Z80 51)   5  Family history of Hyperthyroidism  Maternal Great Grandmother    6  Family history of cerebrovascular accident (CVA) (V17 1) (Z82 3)  Maternal Grandfather    7  Family history of Adenocarcinoma Of The Skin   8  Family history of Prostate Cancer (Z32 88)  Paternal Grandfather    5  Family history of myocardial infarction (V17 3) (Z82 49)  Family History Reviewed: The family history was reviewed and updated today         Social History    · Always uses seat belt   · History of Birth Control Method - Condoms   · Denied: History of Drug Use   · Marital History - Currently    · Never a smoker   · Never Drank Alcohol   · Non-smoker (V49 89) (Z78 9)   · Occupation:    · Presence of (intrauterine) contraceptive device (V45 51) (Z97 5)  Social History Reviewed: The social history was reviewed and updated today  The social history was reviewed and is unchanged  Current Meds    1  Cetirizine HCl - 10 MG Oral Tablet; TAKE 1 TABLET DAILY AS DIRECTED; Therapy: (Lugenia Moat) to Recorded   2  Lexapro 5 MG Oral Tablet; TAKE 1 TABLET DAILY; Therapy: 88Tgt4545 to (Evaluate:27Mar2018)  Requested for: 09OFR9134; Last Rx:27Nov2017     Ordered   3  LORazepam 0 5 MG Oral Tablet; TAKE 1 TABLET DAILY AS NEEDED; Therapy: 59Pyz9789 to (Evaluate:25Izi3141); Last Rx:08Anv5807 Ordered   4  Ventolin  (90 Base) MCG/ACT Inhalation Aerosol Solution; INHALE 1 TO 2 PUFFS EVERY 4     TO 6 HOURS AS NEEDED; Therapy: 29TQF6015 to (Last Rx:47Yzq6033)  Requested for: 41Gia9931 Ordered  Medication List Reviewed: The medication list was reviewed and updated today  Allergies   1  Penicillins  2  Animal dander - Cats   3  Dust   4  Mold    Vitals   Vital Signs    Recorded: 12WOT7471 04:37PM   Heart Rate 80   Respiration 16   Systolic 403, RUE, Sitting   Diastolic 82, RUE, Sitting   Height 5 ft 8 in   Weight 213 lb 0 6 oz   BMI Calculated 32 39   BSA Calculated 2 1   O2 Saturation 98     Physical Exam        Constitutional      General appearance: No acute distress, well appearing and well nourished  Eyes      Conjunctiva and lids: No swelling, erythema or discharge  Pupils and irises: Equal, round and reactive to light  Ears, Nose, Mouth, and Throat      External inspection of ears and nose: Normal        Otoscopic examination: Tympanic membranes translucent with normal light reflex  Canals patent without erythema         Nasal mucosa, septum, and turbinates: Normal without edema or erythema  Oropharynx: Normal with no erythema, edema, exudate or lesions  Pulmonary      Respiratory effort: No increased work of breathing or signs of respiratory distress  Auscultation of lungs: Clear to auscultation  Cardiovascular      Auscultation of heart: Normal rate and rhythm, normal S1 and S2, without murmurs  Examination of extremities for edema and/or varicosities: Normal        Abdomen      Abdomen: Non-tender, no masses  Liver and spleen: No hepatomegaly or splenomegaly  Lymphatic      Palpation of lymph nodes in neck: No lymphadenopathy  Psychiatric      Mood and affect: Abnormal   Mood and Affect: anxious-- and-- depressed  Health Management   Asthma   SPIROMETRY W/ BRONCHO- POC; every 1 year; Next Due: 24Wge1243; Overdue  Screening for human papillomavirus (HPV)   (1) THIN PREP PAP FOLLOW UP WITH IMAGING; every 3 years; Last 73JGU3079; Next Due:    47QGF4993;  Overdue    Signatures    Electronically signed by : Juan Robles DO; Jan 21 2018 12:22PM EST                       (Author)

## 2018-01-23 VITALS
DIASTOLIC BLOOD PRESSURE: 82 MMHG | HEIGHT: 68 IN | BODY MASS INDEX: 32.29 KG/M2 | OXYGEN SATURATION: 98 % | WEIGHT: 213.04 LBS | SYSTOLIC BLOOD PRESSURE: 138 MMHG | RESPIRATION RATE: 16 BRPM | HEART RATE: 80 BPM

## 2018-01-23 NOTE — PSYCH
History of Present Illness  Psychotherapy Provided [facility]: Individual Psychotherapy 25 minutes minutes provided today  Goals addressed in session:   Daphnie Singh struggling with stress and anxiety secondary to menatl health status of  well known to me  He is doing very well throughout day, working and spending time with family  However, takes his PM meds and can't function to point he slumps over in chair, can't communicate or walk at times  In addition, she has two children so this leaves little time for herself  Doing well at work  She is pleasant, verbal and cooperative  HPI - Psych: Daphnie Singh reports her mood is stable  Overall, has been very happy with how  has been doing and improvements in how he relates to her and children  However, his struggles in evening have been anxiety producing  Deals with additional family stressors but managing   Note   Note:   Encouraged her to attend 's next appt with psychiatrist  Will reach out to him and communicate issues to his psychiatrist after  Reviewed stress Mgmt strategies for her to employ  Assessment    1  Anxiety (300 00) (F41 9)    Plan   1  (1) COMPREHENSIVE METABOLIC PANEL; Status:Active; Requested YCQ:04FTI5143;    2  (1) LIPID PANEL, FASTING; Status:Active; Requested CPL:51YUP2584;    3  Escitalopram Oxalate 10 MG Oral Tablet;  Take 1 tablet daily    Signatures   Electronically signed by : Moises Sanchez LCSW; Jan 17 2018  6:12PM EST                       (Author)

## 2018-01-29 ENCOUNTER — TELEPHONE (OUTPATIENT)
Dept: INTERNAL MEDICINE CLINIC | Facility: CLINIC | Age: 38
End: 2018-01-29

## 2018-01-30 ENCOUNTER — TELEPHONE (OUTPATIENT)
Dept: INTERNAL MEDICINE CLINIC | Facility: CLINIC | Age: 38
End: 2018-01-30

## 2018-01-31 ENCOUNTER — OFFICE VISIT (OUTPATIENT)
Dept: INTERNAL MEDICINE CLINIC | Facility: CLINIC | Age: 38
End: 2018-01-31
Payer: COMMERCIAL

## 2018-01-31 VITALS
WEIGHT: 215.6 LBS | SYSTOLIC BLOOD PRESSURE: 124 MMHG | DIASTOLIC BLOOD PRESSURE: 76 MMHG | OXYGEN SATURATION: 98 % | HEART RATE: 86 BPM | TEMPERATURE: 99.3 F | RESPIRATION RATE: 16 BRPM | HEIGHT: 69 IN | BODY MASS INDEX: 31.93 KG/M2

## 2018-01-31 DIAGNOSIS — J01.10 ACUTE FRONTAL SINUSITIS, RECURRENCE NOT SPECIFIED: Primary | ICD-10-CM

## 2018-01-31 DIAGNOSIS — R51.9 HEADACHE ABOVE THE EYE REGION: ICD-10-CM

## 2018-01-31 DIAGNOSIS — F41.9 ANXIETY: ICD-10-CM

## 2018-01-31 PROCEDURE — 99213 OFFICE O/P EST LOW 20 MIN: CPT | Performed by: NURSE PRACTITIONER

## 2018-01-31 PROCEDURE — 3008F BODY MASS INDEX DOCD: CPT | Performed by: NURSE PRACTITIONER

## 2018-01-31 RX ORDER — LORAZEPAM 0.5 MG/1
1 TABLET ORAL DAILY PRN
COMMUNITY
Start: 2016-08-25 | End: 2018-05-12 | Stop reason: ALTCHOICE

## 2018-01-31 RX ORDER — DOXYCYCLINE 100 MG/1
100 CAPSULE ORAL 2 TIMES DAILY
Qty: 14 CAPSULE | Refills: 0 | Status: SHIPPED | OUTPATIENT
Start: 2018-01-31 | End: 2018-02-07

## 2018-01-31 RX ORDER — ALBUTEROL SULFATE 90 UG/1
1-2 AEROSOL, METERED RESPIRATORY (INHALATION)
COMMUNITY
Start: 2012-10-15

## 2018-01-31 RX ORDER — ESCITALOPRAM OXALATE 10 MG/1
1 TABLET ORAL DAILY
COMMUNITY
Start: 2017-08-25 | End: 2018-04-25 | Stop reason: SDUPTHER

## 2018-01-31 NOTE — PROGRESS NOTES
Assessment/Plan:     Diagnoses and all orders for this visit:    Acute frontal sinusitis, recurrence not specified  -     doxycycline monohydrate (MONODOX) 100 mg capsule; Take 1 capsule (100 mg total) by mouth 2 (two) times a day for 7 days    Headache above the eye region    Anxiety  Comments:  ok to resume lexapro 10 mg daily  call for increasing/worsening headache  follow up with Dr Tiffany Edwards as scheduled for April    Other orders  -     albuterol (VENTOLIN HFA) 90 mcg/act inhaler; Inhale 1-2 puffs  -     LORazepam (ATIVAN) 0 5 mg tablet; Take 1 tablet by mouth daily as needed  -     escitalopram (LEXAPRO) 10 mg tablet; Take 1 tablet by mouth daily          Subjective:      Patient ID: Josie Hewitt is a 40 y o  female  URI about 3 weeks ago  Recently started on lexapro 5mg by Dr Prince Liu on 1/18  He increased dose to 10 mg last week  A few days after starting 10 mg dose, she developed a headache so she called into office and was advised to stop lexapro and get evaluated if headache continues  She still has a headache, some congestion, frontal maxillary tenderness and left ear popping  Headache relieved by ibuprofen           No family history on file  Past Medical History:   Diagnosis Date    Asthma     SV[facility]ra[facility]cardia) ([facility])      Social History     Social History    Marital status: /Civil Union     Spouse name: N/A    Number of children: N/A    Years of education: N/A     Occupational History    Not on file       Social History Main Topics    Smoking status: Never Smoker    Smokeless tobacco: Never Used    Alcohol use No    Drug use: No    Sexual activity: Not on file     Other Topics Concern    Not on file     Social History Narrative    No narrative on file       Current Outpatient Prescriptions:     albuterol (VENTOLIN HFA) 90 mcg/act inhaler, Inhale 1-2 puffs, Disp: , Rfl:     doxycycline monohydrate (MONODOX) 100 mg capsule, Take 1 capsule (100 mg total) by mouth 2 (two) times a day for 7 days, Disp: 14 capsule, Rfl: 0    escitalopram (LEXAPRO) 10 mg tablet, Take 1 tablet by mouth daily, Disp: , Rfl:     LORazepam (ATIVAN) 0 5 mg tablet, Take 1 tablet by mouth daily as needed, Disp: , Rfl:   Allergies   Allergen Reactions    Cat Hair Extract     Dust Mite Extract     Molds & Smuts     Penicillins Rash     The following portions of the patient's history were reviewed and updated as appropriate: allergies, current medications, past family history, past medical history, past social history, past surgical history and problem list     Review of Systems   Constitutional: Negative for fever  HENT: Positive for congestion, sinus pain and sinus pressure  Negative for sore throat  Eyes: Negative for visual disturbance  Respiratory: Negative for cough, chest tightness and shortness of breath  Cardiovascular: Negative for chest pain and palpitations  Gastrointestinal: Negative for diarrhea, nausea and vomiting  Musculoskeletal: Negative for myalgias  Neurological: Positive for headaches  Psychiatric/Behavioral: The patient is nervous/anxious  Objective:     Physical Exam   Constitutional: She is oriented to person, place, and time  She appears well-developed and well-nourished  HENT:   Right Ear: External ear normal    Left Ear: External ear normal    Nose: Mucosal edema present  Right sinus exhibits frontal sinus tenderness  Left sinus exhibits frontal sinus tenderness  Mouth/Throat: Oropharynx is clear and moist    Eyes: Pupils are equal, round, and reactive to light  Neck: Neck supple  Cardiovascular: Normal rate, regular rhythm and normal heart sounds  Pulmonary/Chest: Effort normal and breath sounds normal    Lymphadenopathy:     She has no cervical adenopathy  Neurological: She is alert and oriented to person, place, and time  Psychiatric: She has a normal mood and affect  Her behavior is normal    Vitals reviewed        Vitals: 01/31/18 1135   BP: 124/76   Pulse: 86   Resp: 16   Temp: 99 3 °F (37 4 °C)   SpO2: 98%

## 2018-01-31 NOTE — LETTER
January 31, 2018     Patient: Gonzalo Delatorre   YOB: 1980   Date of Visit: 1/31/2018       To Whom it May Concern:    Renetta Osuna is under my professional care  She was seen in my office on 1/31/2018  She may return to work on 1/31/2018  If you have any questions or concerns, please don't hesitate to call           Sincerely,          JOHAN Munoz        CC: No Recipients

## 2018-01-31 NOTE — PATIENT INSTRUCTIONS
Rhinosinusitis   AMBULATORY CARE:   Rhinosinusitis (RS)  is inflammation of your nose and sinuses  It commonly begins as a virus, often as a common cold  Viruses usually last 7 to 10 days and do not need treatment  When the virus does not get better on its own, you may have bacterial RS  This means that bacteria have begun to grow inside your sinuses  Acute RS lasts less than 4 weeks  Chronic RS lasts 12 weeks or more  Recurrent RS is when you have 4 or more episodes of RS in one year  Your signs and symptoms  may be worse when you lie on your back or try to sleep  You may have any of the following:  · Stuffy nose and reduced sense of smell     · Runny nose with thick yellow or green mucus     · Pressure or pain on your face or a headache     · Pain in your teeth or bad breath     · Ear pain or pressure     · Fever or cough     · Tiredness  Seek care immediately if:   · You have double vision or you cannot see  · You have a stiff neck, a fever, or a bad headache  · Your eyeball bulges out or you cannot move your eye  · Your eye and eyelid are red, swollen, and painful  · You cannot open your eye  · You are more sleepy than normal, or you notice changes in your ability to think, move, or talk  · You have swelling of your forehead or scalp  Contact your healthcare provider if:   · Your symptoms are worse or do not improve after 3 to 5 days of treatment  · You have questions or concerns about your condition or care  Treatment for rhinosinusitis  may include any of the following:  · Acetaminophen  decreases pain and fever  It is available without a doctor's order  Ask how much to take and how often to take it  Follow directions  Acetaminophen can cause liver damage if not taken correctly  · NSAIDs , such as ibuprofen, help decrease swelling, pain, and fever  This medicine is available with or without a doctor's order   NSAIDs can cause stomach bleeding or kidney problems in certain people  If you take blood thinner medicine, always ask your healthcare provider if NSAIDs are safe for you  Always read the medicine label and follow directions  · Nasal steroid sprays  decrease inflammation in your nose and sinuses  · Decongestants  reduce swelling and drain mucus in the nose and sinuses  They may help you breathe easier  · Antihistamines  dry mucus in the nose and relieve sneezing  · Antibiotics  treat a bacterial infection and may be needed if your symptoms do not improve or they get worse  · Take your medicine as directed  Contact your healthcare provider if you think your medicine is not helping or if you have side effects  Tell him or her if you are allergic to any medicine  Keep a list of the medicines, vitamins, and herbs you take  Include the amounts, and when and why you take them  Bring the list or the pill bottles to follow-up visits  Carry your medicine list with you in case of an emergency  Self-care:   · Rinse your sinuses  Use a sinus rinse device to rinse your nasal passages with a saline (salt water) solution  This will help thin the mucus in your nose and rinse away pollen and dirt  It will also help reduce swelling so you can breathe normally  Ask your healthcare provider how often to do this  · Breathe in steam   Heat a bowl of water until you see steam  Lean over the bowl and make a tent over your head with a large towel  Breathe deeply for about 20 minutes  Be careful not to get too close to the steam or burn yourself  Do this 3 times a day  You can also breathe deeply when you take a hot shower  · Sleep with your head elevated  Place an extra pillow under your head before you go to sleep to help your sinuses drain  · Drink liquids as directed  Ask your healthcare provider how much liquid to drink each day and which liquids are best for you  Liquids will thin the mucus in your nose and help it drain   Avoid drinks that contain alcohol or caffeine  · Do not smoke, and avoid secondhand smoke  Nicotine and other chemicals in cigarettes and cigars can make your symptoms worse  Ask your healthcare provider for information if you currently smoke and need help to quit  E-cigarettes or smokeless tobacco still contain nicotine  Talk to your healthcare provider before you use these products  Follow up with your healthcare provider as directed: Follow up if your symptoms are worse or not better after 3 to 5 days of treatment  Write down your questions so you remember to ask them during your visits  © 2017 [employer] Information is for End User's use only and may not be sold, redistributed or otherwise used for commercial purposes  All illustrations and images included in CareNotes® are the copyrighted property of [employer]  or [employer]  The above information is an  only  It is not intended as medical advice for individual conditions or treatments  Talk to your doctor, nurse or pharmacist before following any medical regimen to see if it is safe and effective for you

## 2018-04-04 ENCOUNTER — ANNUAL EXAM (OUTPATIENT)
Dept: OBGYN CLINIC | Facility: CLINIC | Age: 38
End: 2018-04-04
Payer: COMMERCIAL

## 2018-04-04 VITALS
HEIGHT: 68 IN | SYSTOLIC BLOOD PRESSURE: 116 MMHG | BODY MASS INDEX: 33.52 KG/M2 | WEIGHT: 221.2 LBS | DIASTOLIC BLOOD PRESSURE: 70 MMHG

## 2018-04-04 DIAGNOSIS — Z01.419 ENCOUNTER FOR GYNECOLOGICAL EXAMINATION WITHOUT ABNORMAL FINDING: Primary | ICD-10-CM

## 2018-04-04 DIAGNOSIS — R63.5 WEIGHT GAIN: ICD-10-CM

## 2018-04-04 PROCEDURE — S0612 ANNUAL GYNECOLOGICAL EXAMINA: HCPCS | Performed by: OBSTETRICS & GYNECOLOGY

## 2018-04-04 PROCEDURE — 58301 REMOVE INTRAUTERINE DEVICE: CPT | Performed by: OBSTETRICS & GYNECOLOGY

## 2018-04-04 NOTE — PROGRESS NOTES
Assessment/Plan:    No problem-specific Assessment & Plan notes found for this encounter  Diagnoses and all orders for this visit:    Encounter for gynecological examination without abnormal finding        Annual examination was completed  IUD was removed without difficulty  Patient was counseled that fertility will return immediately  She will use condoms for contraception  She will update me in the next 3-6 months if she would desire replacement  Patient to return in 1 year  Subjective:      Patient ID: Pollo Hansen is a 40 y o  female  Patient returns  for annual examination  She has no new medical surgical issues  She has desire to have IUD removed as she has had difficulty with inability to lose weight  She has been amenorrheic  She will use condoms for contraception  Gynecologic Exam         The following portions of the patient's history were reviewed and updated as appropriate:   She  has a past medical history of Asthma and SVT (supraventricular tachycardia) ([facility] [address] )  She   Patient Active Problem List    Diagnosis Date Noted    Encounter for gynecological examination without abnormal finding 04/04/2018    Anxiety 08/19/2016    Headache 07/14/2016    Asthma 12/10/2012     She  has a past surgical history that includes Cholecystectomy  Her family history includes Diabetes in her father and mother; [facility]' disease in her father; Hypertension in her mother; Kidney cancer in her father; Thyroid disease in her mother  She  reports that she has never smoked  She has never used smokeless tobacco  She reports that she does not drink alcohol or use drugs    Current Outpatient Prescriptions   Medication Sig Dispense Refill    albuterol (VENTOLIN HFA) 90 mcg/act inhaler Inhale 1-2 puffs      escitalopram (LEXAPRO) 10 mg tablet Take 1 tablet by mouth daily      LORazepam (ATIVAN) 0 5 mg tablet Take 1 tablet by mouth daily as needed       No current facility-administered medications for this visit  Current Outpatient Prescriptions on File Prior to Visit   Medication Sig    albuterol (VENTOLIN HFA) 90 mcg/act inhaler Inhale 1-2 puffs    escitalopram (LEXAPRO) 10 mg tablet Take 1 tablet by mouth daily    LORazepam (ATIVAN) 0 5 mg tablet Take 1 tablet by mouth daily as needed     No current facility-administered medications on file prior to visit  She is allergic to cat hair extract; dust mite extract; molds & smuts; and penicillins       Review of Systems   All other systems reviewed and are negative  Objective:      /70 (BP Location: Left arm, Patient Position: Sitting, Cuff Size: Standard)   Ht 5' 8" (1 727 m)   Wt 100 kg (221 lb 3 2 oz)   BMI 33 63 kg/m²          Physical Exam   Constitutional: She is oriented to person, place, and time  She appears well-developed and well-nourished  HENT:   Head: Normocephalic and atraumatic  Nose: Nose normal    Eyes: EOM are normal  Pupils are equal, round, and reactive to light  Neck: Normal range of motion  Neck supple  No thyromegaly present  Cardiovascular: Normal rate and regular rhythm  Pulmonary/Chest: Effort normal and breath sounds normal  No respiratory distress  Breasts no masses, tenderness, skin changes   Abdominal: Soft  Bowel sounds are normal  She exhibits no distension and no mass  There is no tenderness  Hernia confirmed negative in the right inguinal area and confirmed negative in the left inguinal area  Genitourinary: Vagina normal and uterus normal  No breast swelling, tenderness, discharge or bleeding  Pelvic exam was performed with patient supine  No labial fusion  There is no rash, tenderness, lesion or injury on the right labia  There is no rash, tenderness, lesion or injury on the left labia  Cervix exhibits no motion tenderness, no discharge and no friability     Genitourinary Comments: Ext genitalia nl female w/o lesions  Cervix healthy w/o lesions or discharge; IUD strings noted  uterus nl size, NT, no mass  Adnexa NT, no mass   Musculoskeletal: Normal range of motion  She exhibits no edema  Lymphadenopathy:        Right: No inguinal adenopathy present  Left: No inguinal adenopathy present  Neurological: She is alert and oriented to person, place, and time  She has normal reflexes  Skin: Skin is warm and dry  No rash noted  Psychiatric: She has a normal mood and affect  Her behavior is normal  Thought content normal    Nursing note and vitals reviewed      Iud removal  Date/Time: 4/4/2018 8:59 AM  Performed by: Shelah Severance  Authorized by: Shelah Severance     Consent:     Consent obtained:  Verbal    Consent given by:  Patient    Procedure risks and benefits discussed: yes      Patient questions answered: yes      Patient agrees, verbalizes understanding, and wants to proceed: yes      Educational handouts given: no      Instructions and paperwork completed: no    Procedure:     Removed with no complications: yes      Removal due to mechanical complications of IUD: no      Removal due to infection and inflammatory reaction: no      Other reason for removal:  Inability to lose weight

## 2018-04-25 DIAGNOSIS — F41.9 ANXIETY: Primary | ICD-10-CM

## 2018-04-25 RX ORDER — ESCITALOPRAM OXALATE 10 MG/1
TABLET ORAL DAILY
Qty: 90 TABLET | Refills: 0 | Status: SHIPPED | OUTPATIENT
Start: 2018-04-25 | End: 2018-05-12 | Stop reason: ALTCHOICE

## 2018-05-12 ENCOUNTER — APPOINTMENT (EMERGENCY)
Dept: CT IMAGING | Facility: HOSPITAL | Age: 38
End: 2018-05-12
Payer: COMMERCIAL

## 2018-05-12 ENCOUNTER — HOSPITAL ENCOUNTER (EMERGENCY)
Facility: HOSPITAL | Age: 38
Discharge: HOME | End: 2018-05-12
Attending: EMERGENCY MEDICINE
Payer: COMMERCIAL

## 2018-05-12 VITALS
SYSTOLIC BLOOD PRESSURE: 138 MMHG | DIASTOLIC BLOOD PRESSURE: 77 MMHG | HEART RATE: 78 BPM | BODY MASS INDEX: 33.41 KG/M2 | HEIGHT: 68 IN | RESPIRATION RATE: 18 BRPM | WEIGHT: 220.46 LBS | TEMPERATURE: 98 F | OXYGEN SATURATION: 99 %

## 2018-05-12 DIAGNOSIS — N23 RENAL COLIC ON LEFT SIDE: Primary | ICD-10-CM

## 2018-05-12 LAB
ALBUMIN SERPL BCP-MCNC: 4.2 G/DL (ref 3.5–5)
ALP SERPL-CCNC: 57 U/L (ref 46–116)
ALT SERPL W P-5'-P-CCNC: 23 U/L (ref 12–78)
ANION GAP SERPL CALCULATED.3IONS-SCNC: 9 MMOL/L (ref 4–13)
AST SERPL W P-5'-P-CCNC: 14 U/L (ref 5–45)
BACTERIA UR QL AUTO: ABNORMAL /HPF
BASOPHILS # BLD AUTO: 0.05 THOUSANDS/ΜL (ref 0–0.1)
BASOPHILS NFR BLD AUTO: 1 % (ref 0–1)
BILIRUB SERPL-MCNC: 0.6 MG/DL (ref 0.2–1)
BILIRUB UR QL STRIP: NEGATIVE
BUN SERPL-MCNC: 21 MG/DL (ref 5–25)
CALCIUM SERPL-MCNC: 9.3 MG/DL (ref 8.3–10.1)
CHLORIDE SERPL-SCNC: 103 MMOL/L (ref 100–108)
CLARITY UR: CLEAR
CO2 SERPL-SCNC: 27 MMOL/L (ref 21–32)
COLOR UR: YELLOW
CREAT SERPL-MCNC: 0.84 MG/DL (ref 0.6–1.3)
EOSINOPHIL # BLD AUTO: 0.22 THOUSAND/ΜL (ref 0–0.61)
EOSINOPHIL NFR BLD AUTO: 3 % (ref 0–6)
ERYTHROCYTE [DISTWIDTH] IN BLOOD BY AUTOMATED COUNT: 13.2 % (ref 11.6–15.1)
EXT PREG TEST URINE: NEGATIVE
GFR SERPL CREATININE-BSD FRML MDRD: 89 ML/MIN/1.73SQ M
GLUCOSE SERPL-MCNC: 98 MG/DL (ref 65–140)
GLUCOSE UR STRIP-MCNC: NEGATIVE MG/DL
HCT VFR BLD AUTO: 40.8 % (ref 34.8–46.1)
HGB BLD-MCNC: 13.8 G/DL (ref 11.5–15.4)
HGB UR QL STRIP.AUTO: ABNORMAL
KETONES UR STRIP-MCNC: NEGATIVE MG/DL
LEUKOCYTE ESTERASE UR QL STRIP: ABNORMAL
LYMPHOCYTES # BLD AUTO: 2.48 THOUSANDS/ΜL (ref 0.6–4.47)
LYMPHOCYTES NFR BLD AUTO: 33 % (ref 14–44)
MCH RBC QN AUTO: 28.6 PG (ref 26.8–34.3)
MCHC RBC AUTO-ENTMCNC: 33.8 G/DL (ref 31.4–37.4)
MCV RBC AUTO: 85 FL (ref 82–98)
MONOCYTES # BLD AUTO: 0.59 THOUSAND/ΜL (ref 0.17–1.22)
MONOCYTES NFR BLD AUTO: 8 % (ref 4–12)
NEUTROPHILS # BLD AUTO: 4.05 THOUSANDS/ΜL (ref 1.85–7.62)
NEUTS SEG NFR BLD AUTO: 55 % (ref 43–75)
NITRITE UR QL STRIP: NEGATIVE
NON-SQ EPI CELLS URNS QL MICRO: ABNORMAL /HPF
NRBC BLD AUTO-RTO: 0 /100 WBCS
PH UR STRIP.AUTO: 6 [PH] (ref 4.5–8)
PLATELET # BLD AUTO: 259 THOUSANDS/UL (ref 149–390)
PMV BLD AUTO: 9 FL (ref 8.9–12.7)
POTASSIUM SERPL-SCNC: 3.8 MMOL/L (ref 3.5–5.3)
PROT SERPL-MCNC: 7.5 G/DL (ref 6.4–8.2)
PROT UR STRIP-MCNC: NEGATIVE MG/DL
RBC # BLD AUTO: 4.82 MILLION/UL (ref 3.81–5.12)
RBC #/AREA URNS AUTO: ABNORMAL /HPF
SODIUM SERPL-SCNC: 139 MMOL/L (ref 136–145)
SP GR UR STRIP.AUTO: 1.01 (ref 1–1.03)
UROBILINOGEN UR QL STRIP.AUTO: 0.2 E.U./DL
WBC # BLD AUTO: 7.42 THOUSAND/UL (ref 4.31–10.16)
WBC #/AREA URNS AUTO: ABNORMAL /HPF

## 2018-05-12 PROCEDURE — 74176 CT ABD & PELVIS W/O CONTRAST: CPT

## 2018-05-12 PROCEDURE — 36415 COLL VENOUS BLD VENIPUNCTURE: CPT | Performed by: PHYSICIAN ASSISTANT

## 2018-05-12 PROCEDURE — 96361 HYDRATE IV INFUSION ADD-ON: CPT

## 2018-05-12 PROCEDURE — 81001 URINALYSIS AUTO W/SCOPE: CPT | Performed by: PHYSICIAN ASSISTANT

## 2018-05-12 PROCEDURE — 96374 THER/PROPH/DIAG INJ IV PUSH: CPT

## 2018-05-12 PROCEDURE — 81025 URINE PREGNANCY TEST: CPT | Performed by: PHYSICIAN ASSISTANT

## 2018-05-12 PROCEDURE — 80053 COMPREHEN METABOLIC PANEL: CPT | Performed by: PHYSICIAN ASSISTANT

## 2018-05-12 PROCEDURE — 99284 EMERGENCY DEPT VISIT MOD MDM: CPT

## 2018-05-12 PROCEDURE — 85025 COMPLETE CBC W/AUTO DIFF WBC: CPT | Performed by: PHYSICIAN ASSISTANT

## 2018-05-12 RX ORDER — KETOROLAC TROMETHAMINE 30 MG/ML
30 INJECTION, SOLUTION INTRAMUSCULAR; INTRAVENOUS ONCE
Status: COMPLETED | OUTPATIENT
Start: 2018-05-12 | End: 2018-05-12

## 2018-05-12 RX ADMIN — KETOROLAC TROMETHAMINE 30 MG: 30 INJECTION, SOLUTION INTRAMUSCULAR at 09:03

## 2018-05-12 RX ADMIN — SODIUM CHLORIDE 1000 ML: 0.9 INJECTION, SOLUTION INTRAVENOUS at 08:55

## 2018-05-12 NOTE — DISCHARGE INSTRUCTIONS
Renal Colic   WHAT YOU NEED TO KNOW:   Renal colic is severe pain in your lower back or sides  The pain is usually on one side, but may be on both sides of your lower back  Renal colic may start quickly, come and go, and become worse over time  Renal colic is caused by a blockage in your urinary tract  The most common cause of a blockage is a kidney stone  Blood clots, ureter spasms, and dead tissue may also block your urinary tract  DISCHARGE INSTRUCTIONS:   Return to the emergency department if:   · You cannot stop vomiting  · You see new or increased bleeding when you urinate  · You are urinating less than usual, or not at all  · Your pain is not getting better even after treatment  Contact your healthcare provider if:   · You have fever  · You need to urinate more often than usual, or right away  · You see a stone in your urine strainer after you urinate  · You have questions or concerns about your condition or care  Medicines:   · Medicines  may help decrease pain and muscle spasms  You may also need medicine to calm your stomach and stop vomiting  · Take your medicine as directed  Contact your healthcare provider if you think your medicine is not helping or if you have side effects  Tell him of her if you are allergic to any medicine  Keep a list of the medicines, vitamins, and herbs you take  Include the amounts, and when and why you take them  Bring the list or the pill bottles to follow-up visits  Carry your medicine list with you in case of an emergency  Manage your symptoms:   · Drink liquids as directed  to help decrease pain and flush blockages from your urinary tract  Ask how much liquid to drink each day and which liquids are best for you  You may need to drink about 3 liters (12 glasses) of liquids each day  Half of your total daily liquids should be water  Limit coffee, tea, and soda to 2 cups daily  Your urine should be pale and clear      · Strain your urine every time you urinate  Urinate into a strainer (funnel with a fine mesh on the bottom) or glass jar to collect kidney stones  Give the kidney stones to your healthcare provider at your next visit  · Eat a variety of healthy foods  Healthy foods include fruits, vegetables, whole-grain breads, low-fat dairy products, beans, lean meats, and fish  You may need to increase the amount of citrus fruit you eat, such as oranges  Ask your healthcare provider how much salt, calcium, and protein you should eat  · Avoid activity in hot temperatures  Heat may cause you to become dehydrated and urinate less  Follow up with your healthcare provider as directed: You may need to return for tests to check if your blockage has cleared  Write down your questions so you remember to ask them during your visits  © 2017 [employer] Information is for End User's use only and may not be sold, redistributed or otherwise used for commercial purposes  All illustrations and images included in CareNotes® are the copyrighted property of [employer]  or [employer]  The above information is an  only  It is not intended as medical advice for individual conditions or treatments  Talk to your doctor, nurse or pharmacist before following any medical regimen to see if it is safe and effective for you

## 2018-05-12 NOTE — ED PROVIDER NOTES
History  Chief Complaint   Patient presents with    Flank Pain     Patient c/o left sided flank pain along with left sided abdominal pain  Patient denies nausea and vomitting  60-year-old female presents to the emergency department with complaints of left lower quadrant abdominal pain  States that has developed over the past 20 minutes to an hour and now radiates to the left flank  States she has a history of kidney stones approximately 1 year ago but knows that she had additional stones that were seen higher up at the time  Denies nausea or vomiting at this time  No urinary symptoms  History provided by:  Patient   used: No    Flank Pain   Pain location:  LLQ and L flank  Pain quality: cramping and sharp    Pain severity:  Moderate  Onset quality:  Gradual  Duration:  1 hour  Timing:  Constant  Progression:  Waxing and waning  Chronicity:  New  Relieved by:  Nothing  Ineffective treatments:  None tried  Associated symptoms: no anorexia, no belching, no chest pain, no chills, no constipation, no cough, no diarrhea, no dysuria, no fatigue, no fever, no flatus, no hematemesis, no hematochezia, no hematuria, no melena, no nausea, no shortness of breath, no sore throat, no vaginal bleeding, no vaginal discharge and no vomiting        Prior to Admission Medications   Prescriptions Last Dose Informant Patient Reported? Taking?    albuterol (VENTOLIN HFA) 90 mcg/act inhaler   Yes Yes   Sig: Inhale 1-2 puffs      Facility-Administered Medications: None       Past Medical History:   Diagnosis Date    Asthma     Kidney stones     SVT (supraventricular tachycardia) (HCC)        Past Surgical History:   Procedure Laterality Date    ATRIAL ABLATION SURGERY      CHOLECYSTECTOMY      2006       Family History   Problem Relation Age of Onset    Diabetes Mother     Hypertension Mother     Thyroid disease Mother     Diabetes Father     Kidney cancer Father    Ivy Billings' disease Father I have reviewed and agree with the history as documented  Social History   Substance Use Topics    Smoking status: Never Smoker    Smokeless tobacco: Never Used    Alcohol use No        Review of Systems   Constitutional: Negative for activity change, appetite change, chills, fatigue and fever  HENT: Negative for congestion, dental problem, drooling, ear discharge, ear pain, mouth sores, nosebleeds, rhinorrhea, sore throat and trouble swallowing  Eyes: Negative for pain, discharge and itching  Respiratory: Negative for cough, chest tightness, shortness of breath and wheezing  Cardiovascular: Negative for chest pain and palpitations  Gastrointestinal: Negative for abdominal pain, anorexia, blood in stool, constipation, diarrhea, flatus, hematemesis, hematochezia, melena, nausea and vomiting  Endocrine: Negative for cold intolerance and heat intolerance  Genitourinary: Positive for flank pain  Negative for difficulty urinating, dysuria, frequency, hematuria, urgency, vaginal bleeding and vaginal discharge  Skin: Negative for rash and wound  Allergic/Immunologic: Negative for food allergies and immunocompromised state  Neurological: Negative for dizziness, seizures, syncope, weakness, numbness and headaches  Psychiatric/Behavioral: Negative for agitation, behavioral problems and confusion         Physical Exam  ED Triage Vitals   Temperature Pulse Respirations Blood Pressure SpO2   05/12/18 0848 05/12/18 0848 05/12/18 0848 05/12/18 0848 05/12/18 0848   98 °F (36 7 °C) 77 18 149/71 96 %      Temp src Heart Rate Source Patient Position - Orthostatic VS BP Location FiO2 (%)   -- 05/12/18 0848 05/12/18 0848 05/12/18 0848 --    Monitor Sitting Right arm       Pain Score       05/12/18 0903       7           Orthostatic Vital Signs  Vitals:    05/12/18 0848 05/12/18 0940 05/12/18 1006   BP: 149/71 138/77 138/77   Pulse: 77 68 78   Patient Position - Orthostatic VS: Sitting Lying Lying Physical Exam   Constitutional: She is oriented to person, place, and time  She appears well-developed and well-nourished  No distress  HENT:   Head: Normocephalic and atraumatic  Right Ear: External ear normal    Left Ear: External ear normal    Mouth/Throat: Oropharynx is clear and moist  No oropharyngeal exudate  Eyes: Conjunctivae are normal    Neck: No JVD present  No tracheal deviation present  Cardiovascular: Normal rate, regular rhythm and normal heart sounds  Exam reveals no gallop and no friction rub  No murmur heard  Pulmonary/Chest: Effort normal and breath sounds normal  No respiratory distress  She has no wheezes  She has no rales  She exhibits no tenderness  Abdominal: Soft  Bowel sounds are normal  She exhibits no distension  There is tenderness in the left lower quadrant  There is no guarding and no CVA tenderness  Musculoskeletal: Normal range of motion  She exhibits no edema, tenderness or deformity  Lymphadenopathy:     She has no cervical adenopathy  Neurological: She is alert and oriented to person, place, and time  Skin: Skin is warm and dry  No rash noted  She is not diaphoretic  No erythema  Psychiatric: She has a normal mood and affect  Her behavior is normal    Nursing note and vitals reviewed        ED Medications  Medications   sodium chloride 0 9 % bolus 1,000 mL (1,000 mL Intravenous New Bag 5/12/18 0855)   ketorolac (TORADOL) injection 30 mg (30 mg Intravenous Given 5/12/18 0903)       Diagnostic Studies  Results Reviewed     Procedure Component Value Units Date/Time    POCT pregnancy, urine [[id number]]  (Normal) Resulted:  05/12/18 1005    Lab Status:  Final result Updated:  05/12/18 1005     EXT PREG TEST UR (Ref: Negative) negative    Urine Microscopic [[id number]]  (Abnormal) Collected:  05/12/18 0938    Lab Status:  Final result Specimen:  Urine from Urine, Clean Catch Updated:  05/12/18 0956     RBC, UA 4-10 (A) /hpf      WBC, UA 2-4 (A) /hpf Epithelial Cells Occasional /hpf      Bacteria, UA Occasional /hpf     UA w Reflex to Microscopic w Reflex to Culture [[id number]]  (Abnormal) Collected:  05/12/18 0938    Lab Status:  Final result Specimen:  Urine from Urine, Clean Catch Updated:  05/12/18 0949     Color, UA Yellow     Clarity, UA Clear     Specific Gravity, UA 1 010     pH, UA 6 0     Leukocytes, UA Small (A)     Nitrite, UA Negative     Protein, UA Negative mg/dl      Glucose, UA Negative mg/dl      Ketones, UA Negative mg/dl      Urobilinogen, UA 0 2 E U /dl      Bilirubin, UA Negative     Blood, UA Moderate (A)    Comprehensive metabolic panel [[id number]] Collected:  05/12/18 0857    Lab Status:  Final result Specimen:  Blood from Arm, Right Updated:  05/12/18 9270     Sodium 139 mmol/L      Potassium 3 8 mmol/L      Chloride 103 mmol/L      CO2 27 mmol/L      Anion Gap 9 mmol/L      BUN 21 mg/dL      Creatinine 0 84 mg/dL      Glucose 98 mg/dL      Calcium 9 3 mg/dL      AST 14 U/L      ALT 23 U/L      Alkaline Phosphatase 57 U/L      Total Protein 7 5 g/dL      Albumin 4 2 g/dL      Total Bilirubin 0 60 mg/dL      eGFR 89 ml/min/1 73sq m     Narrative:         National Kidney Disease Education Program recommendations are as follows:  GFR calculation is accurate only with a steady state creatinine  Chronic Kidney disease less than 60 ml/min/1 73 sq  meters  Kidney failure less than 15 ml/min/1 73 sq  meters      CBC and differential [[id number]] Collected:  05/12/18 0857    Lab Status:  Final result Specimen:  Blood from Arm, Right Updated:  05/12/18 0902     WBC 7 42 Thousand/uL      RBC 4 82 Million/uL      Hemoglobin 13 8 g/dL      Hematocrit 40 8 %      MCV 85 fL      MCH 28 6 pg      MCHC 33 8 g/dL      RDW 13 2 %      MPV 9 0 fL      Platelets 799 Thousands/uL      nRBC 0 /100 WBCs      Neutrophils Relative 55 %      Lymphocytes Relative 33 %      Monocytes Relative 8 %      Eosinophils Relative 3 %      Basophils Relative 1 %      Neutrophils Absolute 4 05 Thousands/µL      Lymphocytes Absolute 2 48 Thousands/µL      Monocytes Absolute 0 59 Thousand/µL      Eosinophils Absolute 0 22 Thousand/µL      Basophils Absolute 0 05 Thousands/µL                  CT renal stone study abdomen pelvis without contrast   Final Result by Raoul Lazo MD (05/12 1059)         1  Punctate nonobstructing calculus within the upper pole the right kidney  2   No calculi seen on the left  Workstation performed: [id number]                    Procedures  Procedures       Phone Contacts  ED Phone Contact    ED Course  ED Course as of May 12 1101   Sat May 12, 2018   1045 Patient without pain at this time  Believes that she may have passed a kidney stone in the urine cup  Will wait for read on CT                                 MDM  Number of Diagnoses or Management Options  Renal colic on left side:   Diagnosis management comments: Differential diagnosis includes but not limited to:  Kidney stone, hydronephrosis, urinary tract infection, pyelonephritis  Amount and/or Complexity of Data Reviewed  Clinical lab tests: ordered and reviewed  Tests in the radiology section of CPT®: ordered and reviewed  Independent visualization of images, tracings, or specimens: yes      CritCare Time    Disposition  Final diagnoses:   Renal colic on left side     Time reflects when diagnosis was documented in both MDM as applicable and the Disposition within this note     Time User Action Codes Description Comment    5/12/2018 11:01 AM Prieto Perez [C68] Renal colic on left side       ED Disposition     ED Disposition Condition Comment    Discharge  Evern Dine discharge to home/self care      Condition at discharge: Stable        Follow-up Information     Follow up With Specialties Details Why Diana Chavez U  51  For Urology [facility] Schedule an appointment as soon as possible for a visit  [address]  [address] [facility] [address]  [phone]        Patient's Medications   Discharge Prescriptions    No medications on file     No discharge procedures on file      ED Provider  Electronically Signed by           Nacho Levi PA-C  05/12/18 2872

## 2018-08-28 ENCOUNTER — TELEPHONE (OUTPATIENT)
Dept: OBGYN CLINIC | Facility: CLINIC | Age: 38
End: 2018-08-28

## 2018-08-28 NOTE — TELEPHONE ENCOUNTER
Pt has taken 2 hpt's which were neg   she is using condoms     Reassured and informed could take as long as 6 months for cycle to regulate  She will monitor

## 2018-08-28 NOTE — TELEPHONE ENCOUNTER
[address] [facility] THAT SHE HAD HER MIRENA REMOVED IN April    SINCE THEN HER PERIODS HAVE BEEN VERY IRREGULAR, MOSTLY COMING EVERY FEW WEEKS    SHE HASN'T HAD A PERIOD IN 5 WEEKS NOW AND HAS TAKEN 2 HPT AND THEY ARE NEGATIVE BUT SHE IS CONCERNED AND WAS WONDERING IF SHE SHOULD HAVE A BLOOD TEST DONE TO DETERMINE IF SHE IS PREGNANT    PLEASE ADVISE PATIENT AT # NOTED

## 2018-09-11 ENCOUNTER — TELEPHONE (OUTPATIENT)
Dept: OBGYN CLINIC | Facility: CLINIC | Age: 38
End: 2018-09-11

## 2018-09-12 DIAGNOSIS — Z30.011 ENCOUNTER FOR INITIAL PRESCRIPTION OF CONTRACEPTIVE PILLS: Primary | ICD-10-CM

## 2018-09-12 RX ORDER — DROSPIRENONE AND ETHINYL ESTRADIOL 0.02-3(28)
1 KIT ORAL DAILY
Qty: 84 TABLET | Refills: 1 | Status: SHIPPED | OUTPATIENT
Start: 2018-09-12 | End: 2019-02-20 | Stop reason: SDUPTHER

## 2019-02-20 DIAGNOSIS — Z30.011 ENCOUNTER FOR INITIAL PRESCRIPTION OF CONTRACEPTIVE PILLS: ICD-10-CM

## 2019-02-20 RX ORDER — DROSPIRENONE AND ETHINYL ESTRADIOL 0.02-3(28)
1 KIT ORAL DAILY
Qty: 84 TABLET | Refills: 0 | Status: SHIPPED | OUTPATIENT
Start: 2019-02-20 | End: 2019-05-14 | Stop reason: SDUPTHER

## 2019-03-28 ENCOUNTER — TELEPHONE (OUTPATIENT)
Dept: NEUROSURGERY | Facility: CLINIC | Age: 39
End: 2019-03-28

## 2019-03-28 DIAGNOSIS — R90.89 ABNORMAL FINDING ON MRI OF BRAIN: Primary | ICD-10-CM

## 2019-03-28 NOTE — TELEPHONE ENCOUNTER
Patient called requesting for a script to be entered for MRI brain  Per Dr Kyle Lund last office note, he states for her to follow up in 2 years with MRI  Script entered  Patient is aware to call central scheduling to schedule  She will call the office back to schedule a follow up after MRI is scheduled

## 2019-04-08 DIAGNOSIS — F40.240 CLAUSTROPHOBIA: Primary | ICD-10-CM

## 2019-04-08 RX ORDER — ALPRAZOLAM 0.5 MG/1
TABLET ORAL
Qty: 2 TABLET | Refills: 0 | Status: SHIPPED | OUTPATIENT
Start: 2019-04-08 | End: 2020-08-10

## 2019-04-09 ENCOUNTER — HOSPITAL ENCOUNTER (OUTPATIENT)
Dept: MRI IMAGING | Facility: HOSPITAL | Age: 39
Discharge: HOME | End: 2019-04-09
Attending: NEUROLOGICAL SURGERY
Payer: COMMERCIAL

## 2019-04-09 DIAGNOSIS — R90.89 ABNORMAL FINDING ON MRI OF BRAIN: ICD-10-CM

## 2019-04-09 PROCEDURE — 70553 MRI BRAIN STEM W/O & W/DYE: CPT

## 2019-04-09 PROCEDURE — A9585 GADOBUTROL INJECTION: HCPCS | Performed by: NEUROLOGICAL SURGERY

## 2019-04-09 RX ADMIN — GADOBUTROL 8 ML: 604.72 INJECTION INTRAVENOUS at 08:03

## 2019-04-16 ENCOUNTER — ANNUAL EXAM (OUTPATIENT)
Dept: OBGYN CLINIC | Facility: CLINIC | Age: 39
End: 2019-04-16

## 2019-04-16 VITALS
WEIGHT: 198 LBS | SYSTOLIC BLOOD PRESSURE: 132 MMHG | BODY MASS INDEX: 30.01 KG/M2 | DIASTOLIC BLOOD PRESSURE: 78 MMHG | HEIGHT: 68 IN

## 2019-04-16 DIAGNOSIS — Z01.419 ENCOUNTER FOR GYNECOLOGICAL EXAMINATION WITHOUT ABNORMAL FINDING: Primary | ICD-10-CM

## 2019-04-16 PROCEDURE — 99395 PREV VISIT EST AGE 18-39: CPT | Performed by: OBSTETRICS & GYNECOLOGY

## 2019-04-18 ENCOUNTER — OFFICE VISIT (OUTPATIENT)
Dept: NEUROSURGERY | Facility: CLINIC | Age: 39
End: 2019-04-18
Payer: COMMERCIAL

## 2019-04-18 VITALS
TEMPERATURE: 98.2 F | DIASTOLIC BLOOD PRESSURE: 80 MMHG | WEIGHT: 195 LBS | HEART RATE: 67 BPM | HEIGHT: 68 IN | BODY MASS INDEX: 29.55 KG/M2 | SYSTOLIC BLOOD PRESSURE: 130 MMHG

## 2019-04-18 DIAGNOSIS — R90.89 ABNORMAL FINDING ON MRI OF BRAIN: Primary | ICD-10-CM

## 2019-04-18 DIAGNOSIS — J34.9 PARANASAL SINUS DISEASE: ICD-10-CM

## 2019-04-18 PROCEDURE — 99213 OFFICE O/P EST LOW 20 MIN: CPT | Performed by: PHYSICIAN ASSISTANT

## 2019-05-13 ENCOUNTER — OFFICE VISIT (OUTPATIENT)
Dept: OBGYN CLINIC | Facility: CLINIC | Age: 39
End: 2019-05-13
Payer: COMMERCIAL

## 2019-05-13 VITALS — DIASTOLIC BLOOD PRESSURE: 80 MMHG | BODY MASS INDEX: 29.61 KG/M2 | SYSTOLIC BLOOD PRESSURE: 128 MMHG | WEIGHT: 196.2 LBS

## 2019-05-13 DIAGNOSIS — N90.89 SKIN TAG OF VULVA: Primary | ICD-10-CM

## 2019-05-13 PROCEDURE — 11420 EXC H-F-NK-SP B9+MARG 0.5/<: CPT | Performed by: OBSTETRICS & GYNECOLOGY

## 2019-05-13 PROCEDURE — 88305 TISSUE EXAM BY PATHOLOGIST: CPT | Performed by: PATHOLOGY

## 2019-05-14 DIAGNOSIS — Z30.011 ENCOUNTER FOR INITIAL PRESCRIPTION OF CONTRACEPTIVE PILLS: ICD-10-CM

## 2019-05-15 ENCOUNTER — TELEPHONE (OUTPATIENT)
Dept: OBGYN CLINIC | Facility: CLINIC | Age: 39
End: 2019-05-15

## 2019-08-07 DIAGNOSIS — Z30.011 ENCOUNTER FOR INITIAL PRESCRIPTION OF CONTRACEPTIVE PILLS: ICD-10-CM

## 2019-11-03 DIAGNOSIS — Z30.011 ENCOUNTER FOR INITIAL PRESCRIPTION OF CONTRACEPTIVE PILLS: ICD-10-CM

## 2019-11-07 DIAGNOSIS — Z30.011 ENCOUNTER FOR INITIAL PRESCRIPTION OF CONTRACEPTIVE PILLS: ICD-10-CM

## 2020-04-01 DIAGNOSIS — Z30.011 ENCOUNTER FOR INITIAL PRESCRIPTION OF CONTRACEPTIVE PILLS: ICD-10-CM

## 2020-04-01 RX ORDER — DROSPIRENONE AND ETHINYL ESTRADIOL 0.02-3(28)
1 KIT ORAL DAILY
Qty: 84 TABLET | Refills: 0 | Status: SHIPPED | OUTPATIENT
Start: 2020-04-01 | End: 2020-06-29 | Stop reason: SDUPTHER

## 2020-05-01 ENCOUNTER — TELEMEDICINE (OUTPATIENT)
Dept: INTERNAL MEDICINE CLINIC | Facility: CLINIC | Age: 40
End: 2020-05-01
Payer: COMMERCIAL

## 2020-05-01 DIAGNOSIS — S90.121A CONTUSION OF THIRD TOE OF RIGHT FOOT, INITIAL ENCOUNTER: Primary | ICD-10-CM

## 2020-05-01 PROCEDURE — 99213 OFFICE O/P EST LOW 20 MIN: CPT | Performed by: NURSE PRACTITIONER

## 2020-06-26 DIAGNOSIS — Z30.011 ENCOUNTER FOR INITIAL PRESCRIPTION OF CONTRACEPTIVE PILLS: ICD-10-CM

## 2020-06-29 DIAGNOSIS — Z30.011 ENCOUNTER FOR INITIAL PRESCRIPTION OF CONTRACEPTIVE PILLS: ICD-10-CM

## 2020-06-29 RX ORDER — DROSPIRENONE AND ETHINYL ESTRADIOL 0.02-3(28)
1 KIT ORAL DAILY
Qty: 84 TABLET | Refills: 0 | Status: SHIPPED | OUTPATIENT
Start: 2020-06-29 | End: 2020-07-24 | Stop reason: SDUPTHER

## 2020-07-23 ENCOUNTER — ANNUAL EXAM (OUTPATIENT)
Dept: OBGYN CLINIC | Facility: CLINIC | Age: 40
End: 2020-07-23
Payer: COMMERCIAL

## 2020-07-23 VITALS
SYSTOLIC BLOOD PRESSURE: 122 MMHG | TEMPERATURE: 96 F | HEIGHT: 68 IN | WEIGHT: 191.6 LBS | BODY MASS INDEX: 29.04 KG/M2 | DIASTOLIC BLOOD PRESSURE: 70 MMHG

## 2020-07-23 DIAGNOSIS — Z12.4 CERVICAL CANCER SCREENING: ICD-10-CM

## 2020-07-23 DIAGNOSIS — Z30.011 ENCOUNTER FOR INITIAL PRESCRIPTION OF CONTRACEPTIVE PILLS: ICD-10-CM

## 2020-07-23 DIAGNOSIS — Z12.31 ENCOUNTER FOR SCREENING MAMMOGRAM FOR MALIGNANT NEOPLASM OF BREAST: Primary | ICD-10-CM

## 2020-07-23 DIAGNOSIS — Z11.51 SCREENING FOR HUMAN PAPILLOMAVIRUS (HPV): ICD-10-CM

## 2020-07-23 PROCEDURE — G0145 SCR C/V CYTO,THINLAYER,RESCR: HCPCS | Performed by: OBSTETRICS & GYNECOLOGY

## 2020-07-23 PROCEDURE — 99395 PREV VISIT EST AGE 18-39: CPT | Performed by: OBSTETRICS & GYNECOLOGY

## 2020-07-24 RX ORDER — DROSPIRENONE AND ETHINYL ESTRADIOL 0.02-3(28)
1 KIT ORAL DAILY
Qty: 84 TABLET | Refills: 3 | Status: SHIPPED | OUTPATIENT
Start: 2020-07-24 | End: 2021-08-11 | Stop reason: SDUPTHER

## 2020-07-24 NOTE — PROGRESS NOTES
Assessment/Plan:    1  Encounter for screening mammogram for malignant neoplasm of breast    - Mammo screening bilateral w cad; Future    2  Cervical cancer screening    - Liquid-based pap, screening    3  Screening for human papillomavirus (HPV)    - Liquid-based pap, screening        Subjective      Lito Medina is a 45 y o  female who presents for annual exam  Periods are regular every 28-30 days, lasting 6 days  Dysmenorrhea:none  Cyclic symptoms include none  No intermenstrual bleeding, spotting, or discharge  The patient denies any urinary or sexual issues  Current contraception: OCP (estrogen/progesterone)  History of abnormal Pap smear: no  Family history of uterine or ovarian cancer: no  Regular self breast exam: yes  History of abnormal mammogram: no  Family history of breast cancer: no  History of abnormal lipids: no    Menstrual History:  OB History        3    Para   2    Term   2            AB   1    Living   2       SAB   1    TAB        Ectopic        Multiple        Live Births   2           Obstetric Comments   Menarche - 16 yo                No LMP recorded  (Menstrual status: Birth Control)  The following portions of the patient's history were reviewed and updated as appropriate: allergies, current medications, past family history, past medical history, past social history, past surgical history and problem list     Review of Systems  Pertinent items are noted in HPI  Objective      /70 (BP Location: Left arm, Patient Position: Sitting, Cuff Size: Standard)   Temp (!) 96 °F (35 6 °C) (Tympanic)   Ht 5' 8 25" (1 734 m)   Wt 86 9 kg (191 lb 9 6 oz)   BMI 28 92 kg/m²     General:   alert and oriented, in no acute distress, appears stated age and cooperative   Heart:    Breasts: regular rate and rhythm, S1, S2 normal, no murmur, click, rub or gallop   appear normal, no suspicious masses, no skin or nipple changes or axillary nodes     Lungs: clear to auscultation bilaterally   Abdomen: soft, non-tender, without masses or organomegaly   Vulva: normal   Vagina: normal mucosa   Cervix: multiparous appearance   Uterus: normal size, mobile, non-tender   Adnexa: normal adnexa and no mass, fullness, tenderness

## 2020-07-29 LAB
HPV HR 12 DNA CVX QL NAA+PROBE: ABNORMAL
HPV16 DNA CVX QL NAA+PROBE: ABNORMAL
HPV18 DNA CVX QL NAA+PROBE: ABNORMAL

## 2020-07-30 LAB
LAB AP GYN PRIMARY INTERPRETATION: NORMAL
Lab: NORMAL

## 2020-08-10 ENCOUNTER — OFFICE VISIT (OUTPATIENT)
Dept: INTERNAL MEDICINE CLINIC | Facility: CLINIC | Age: 40
End: 2020-08-10
Payer: COMMERCIAL

## 2020-08-10 VITALS
TEMPERATURE: 98.2 F | WEIGHT: 187.08 LBS | BODY MASS INDEX: 28.35 KG/M2 | HEIGHT: 68 IN | OXYGEN SATURATION: 99 % | DIASTOLIC BLOOD PRESSURE: 72 MMHG | SYSTOLIC BLOOD PRESSURE: 122 MMHG | RESPIRATION RATE: 16 BRPM | HEART RATE: 86 BPM

## 2020-08-10 DIAGNOSIS — Z13.6 SCREENING FOR CARDIOVASCULAR CONDITION: ICD-10-CM

## 2020-08-10 DIAGNOSIS — Z13.1 SCREENING FOR DIABETES MELLITUS: Primary | ICD-10-CM

## 2020-08-10 DIAGNOSIS — Z23 NEED FOR VACCINATION: ICD-10-CM

## 2020-08-10 PROBLEM — Z00.00 WELLNESS EXAMINATION: Status: ACTIVE | Noted: 2020-08-10

## 2020-08-10 PROCEDURE — 3725F SCREEN DEPRESSION PERFORMED: CPT | Performed by: INTERNAL MEDICINE

## 2020-08-10 PROCEDURE — 90715 TDAP VACCINE 7 YRS/> IM: CPT

## 2020-08-10 PROCEDURE — 3008F BODY MASS INDEX DOCD: CPT | Performed by: INTERNAL MEDICINE

## 2020-08-10 PROCEDURE — 90471 IMMUNIZATION ADMIN: CPT

## 2020-08-10 PROCEDURE — 99395 PREV VISIT EST AGE 18-39: CPT | Performed by: INTERNAL MEDICINE

## 2020-08-10 PROCEDURE — 1036F TOBACCO NON-USER: CPT | Performed by: INTERNAL MEDICINE

## 2020-08-10 NOTE — PROGRESS NOTES
Assessment/Plan:    Wellness examination    Assessment and plan 1  Health maintenance annual wellness examination overall the patient is clinically stable and doing well, we encouraged the patient to follow a healthy and balanced diet  We recommend that the patient exercise routinely approximately 30 minutes 5 times per week   We have reviewed the patient's vaccines and have made recommendations for updates if necessary  annual flu shot      We will be ordering screening laboratories which are age appropriate  Return to the office in  1 year    call if any problems  Problem List Items Addressed This Visit     None      Visit Diagnoses     Screening for diabetes mellitus    -  Primary    Relevant Orders    Comprehensive metabolic panel    Hemoglobin A1C    Screening for cardiovascular condition        Relevant Orders    Lipid Panel with Direct LDL reflex    Need for vaccination        Relevant Orders    Tdap vaccine greater than or equal to 8yo IM (Completed)          RTO in 1 year call if any problems  Up no when she is flying in an airplane over last year she has noticed a sharp pain from her right paranasal sinus into her ethmoid MRI does show sinus disease I did recommend using Flonase 2 sprays each nostril once a day for several days prior also she may try Sudafed as directed p r n  Subjective:      Patient ID: Lito Medina is a 45 y o  female  HPI annual wellness examination completed today she reports me that she drives a car safely, wears a seatbelt, wears a sunscreen following a healthy/balance diet sees a dentist, brushes/losses or teeth routinely and sees OBGYN routinely healthy diet, biking and weight , tennis,   With flying gets sharp pain exertion from the right side her nostril up to the I did question the brow region with pressing on it a telephone stopping it with the pressing on it in over time it has resolved happening over the past 2 yrs    The following portions of the patient's history were reviewed and updated as appropriate: allergies, current medications, past family history, past medical history, past social history, past surgical history and problem list     Review of Systems   Constitutional: Negative for activity change, appetite change and unexpected weight change  HENT: Negative for congestion and postnasal drip  Eyes: Negative for visual disturbance  Respiratory: Negative for cough and shortness of breath  Cardiovascular: Negative for chest pain  Gastrointestinal: Negative for abdominal pain, diarrhea, nausea and vomiting  Neurological: Negative for dizziness, light-headedness and headaches  Objective:    No follow-ups on file  No results found  Allergies   Allergen Reactions    Cat Hair Extract     Dust Mite Extract     Molds & Smuts     Penicillins Rash       Past Medical History:   Diagnosis Date    Asthma     Last Assessed: 1/17/2018    Blood type, Rh negative     Kidney stones     Palpitations     Sciatica     Last Assessed: 12/10/2012    Shortness of breath     Last Assessed: 10/23/2012    SVT (supraventricular tachycardia) ([facility] [address] )      Past Surgical History:   Procedure Laterality Date    ATRIAL ABLATION SURGERY      Supraventricular Tachycardia    CHOLECYSTECTOMY      2006    TOOTH EXTRACTION       Current Outpatient Medications on File Prior to Visit   Medication Sig Dispense Refill    albuterol (VENTOLIN HFA) 90 mcg/act inhaler Inhale 1-2 puffs      drospirenone-ethinyl estradiol (Gianvi) 3-0 02 MG per tablet Take 1 tablet by mouth daily 84 tablet 3    [DISCONTINUED] ALPRAZolam (XANAX) 0 5 mg tablet Take 1 tab PO 2 hr prior to MRI  May repeat 30 min prior (Patient not taking: Reported on 4/16/2019) 2 tablet 0     No current facility-administered medications on file prior to visit        Family History   Problem Relation Age of Onset    Diabetes Mother     Hypertension Mother     Thyroid disease Mother    Quanrenee Billyet Hypothyroidism Mother     Diabetes Father     Kidney cancer Father    Georgia Bhanuegers' disease Father     Hyperthyroidism Father     Prostate cancer Brother     Skin cancer Maternal Grandfather     Prostate cancer Maternal Grandfather     Heart attack Paternal Grandfather     Stroke Other         CVA     Social History     Socioeconomic History    Marital status: /Civil Union     Spouse name: Not on file    Number of children: Not on file    Years of education: Not on file    Highest education level: Not on file   Occupational History    Occupation:    Social Needs    Financial resource strain: Not on file    Food insecurity     Worry: Not on file     Inability: Not on file   [employer] needs     Medical: Not on file     Non-medical: Not on file   Tobacco Use    Smoking status: Never Smoker    Smokeless tobacco: Never Used   Substance and Sexual Activity    Alcohol use: Yes     Comment: Social     Drug use: No    Sexual activity: Yes     Partners: Male     Birth control/protection: Pill   Lifestyle    Physical activity     Days per week: Not on file     Minutes per session: Not on file    Stress: Not on file   Relationships    Social connections     Talks on phone: Not on file     Gets together: Not on file     Attends [employer] service: Not on file     Active member of club or organization: Not on file     Attends meetings of clubs or organizations: Not on file     Relationship status: Not on file    Intimate partner violence     Fear of current or ex partner: Not on file     Emotionally abused: Not on file     Physically abused: Not on file     Forced sexual activity: Not on file   Other Topics Concern    Not on file   Social History Narrative    Always uses seat belt     Vitals:    08/10/20 1258   BP: 122/72   Pulse: 86   Resp: 16   Temp: 98 2 °F (36 8 °C)   TempSrc: Temporal   SpO2: 99%   Weight: 84 9 kg (187 lb 1 3 oz)   Height: 5' 8 25" (1 734 m)     Results for orders placed or performed in visit on 07/23/20   HPV High Risk    Specimen: Cervix; Genital   Result Value Ref Range    HPV Other HR Failed (A) Negative    HPV16 Failed (A) Negative    HPV18 Failed (A) Negative   Liquid-based pap, screening   Result Value Ref Range    Case Report       Gynecologic Cytology Report                       Case: [id number]                                  Authorizing Provider:  Weston Garcia MD  Collected:           07/23/2020 7114              Ordering Location[facility]     [facility]     [facility]                                                                First Screen:          [place]                                                                  Specimen:    LIQUID-BASED PAP, SCREENING, Cervix                                                        Primary Interpretation Negative for intraepithelial lesion or malignancy     Specimen Adequacy       Satisfactory for evaluation  Endocervical/transformation zone component present  Additional Information       [employer] FDA approved ,  and ThinPrep Imaging Duo System are utilized with strict adherence to the 's instruction manual to prepare gynecologic and non-gynecologic cytology specimens for the production of ThinPrep slides as well as for gynecologic ThinPrep imaging  These processes have been validated by our laboratory and/or by the   The Pap test is not a diagnostic procedure and should not be used as the sole means to detect cervical cancer  It is only a screening procedure to aid in the detection of cervical cancer and its precursors  Both false-negative and false-positive results have been experienced   Your patient's test result should be interpreted in this context together with the history and clinical findings  Weight (last 2 days)     Date/Time   Weight    08/10/20 1258   84 9 (187 08)            Body mass index is 28 24 kg/m²  BP      Temp      Pulse     Resp      SpO2        Vitals:    08/10/20 1258   Weight: 84 9 kg (187 lb 1 3 oz)     Vitals:    08/10/20 1258   Weight: 84 9 kg (187 lb 1 3 oz)       /72   Pulse 86   Temp 98 2 °F (36 8 °C) (Temporal)   Resp 16   Ht 5' 8 25" (1 734 m)   Wt 84 9 kg (187 lb 1 3 oz)   SpO2 99%   BMI 28 24 kg/m²          Physical Exam  Constitutional:       Appearance: She is well-developed  HENT:      Head: Normocephalic  Eyes:      General: No scleral icterus  Right eye: No discharge  Left eye: No discharge  Conjunctiva/sclera: Conjunctivae normal       Pupils: Pupils are equal, round, and reactive to light  Neck:      Musculoskeletal: Neck supple  Cardiovascular:      Rate and Rhythm: Normal rate and regular rhythm  Heart sounds: Normal heart sounds  No murmur  No friction rub  No gallop  Pulmonary:      Effort: No respiratory distress  Breath sounds: Normal breath sounds  No wheezing or rales  Abdominal:      General: Bowel sounds are normal  There is no distension  Palpations: Abdomen is soft  There is no mass  Tenderness: There is no abdominal tenderness  There is no guarding or rebound  Musculoskeletal:         General: No deformity  Lymphadenopathy:      Cervical: No cervical adenopathy  Neurological:      Mental Status: She is alert        Coordination: Coordination normal

## 2020-08-10 NOTE — ASSESSMENT & PLAN NOTE
Assessment and plan 1  Health maintenance annual wellness examination overall the patient is clinically stable and doing well, we encouraged the patient to follow a healthy and balanced diet  We recommend that the patient exercise routinely approximately 30 minutes 5 times per week   We have reviewed the patient's vaccines and have made recommendations for updates if necessary  annual flu shot      We will be ordering screening laboratories which are age appropriate  Return to the office in  1 year    call if any problems

## 2021-01-14 ENCOUNTER — TELEMEDICINE (OUTPATIENT)
Dept: INTERNAL MEDICINE CLINIC | Facility: CLINIC | Age: 41
End: 2021-01-14
Payer: COMMERCIAL

## 2021-01-14 VITALS — HEIGHT: 69 IN | BODY MASS INDEX: 2.76 KG/M2 | WEIGHT: 18.6 LBS | HEART RATE: 81 BPM

## 2021-01-14 DIAGNOSIS — R00.2 PALPITATION: Primary | ICD-10-CM

## 2021-01-14 PROCEDURE — 99213 OFFICE O/P EST LOW 20 MIN: CPT | Performed by: NURSE PRACTITIONER

## 2021-01-14 PROCEDURE — 3008F BODY MASS INDEX DOCD: CPT | Performed by: NURSE PRACTITIONER

## 2021-01-14 RX ORDER — FLUTICASONE PROPIONATE 50 MCG
1 SPRAY, SUSPENSION (ML) NASAL DAILY
COMMUNITY

## 2021-01-14 NOTE — PROGRESS NOTES
Virtual Brief Visit    Assessment/Plan:    Problem List Items Addressed This Visit        Other    Palpitation - Primary     holter monitor ordered    Avoid caffeine and stress  Sleep 7-8 hours         Relevant Orders    Holter monitor - 48 hour                Reason for visit is   Chief Complaint   Patient presents with    Virtual Brief Visit        Encounter provider JOHAN Chester    Provider located at [address] [place] [phone]    Recent Visits  Date Type Provider Dept   01/14/21 Telemedicine Maeve Chester recent visits within past 7 days and meeting all other requirements     Future Appointments  No visits were found meeting these conditions  Showing future appointments within next 150 days and meeting all other requirements        After connecting through [employer] and patient was informed that this is a secure, HIPAA-compliant platform  She agrees to proceed  , the patient was identified by name and date of birth  Avinasho Gitelman was informed that this is a telemedicine visit and that the visit is being conducted through [employer] and patient was informed that this is a secure, HIPAA-compliant platform  She agrees to proceed     My office door was closed  No one else was in the room  She acknowledged consent and understanding of privacy and security of the platform  The patient has agreed to participate and understands she can discontinue the visit at any time  Patient is aware this is a billable service  Barney Yadav is a 45 y o  female     Patient is here for intermittent palpitations  Denies sob, cp, dizziness  No cardiac history       Past Medical History:   Diagnosis Date    Asthma     Last Assessed: 1/17/2018    Blood type, Rh negative     Kidney stones     Palpitations     Sciatica     Last Assessed: 12/10/2012    Shortness of breath     Last Assessed: 10/23/2012    SVT (supraventricular tachycardia) ([facility] [address] )        Past Surgical History:   Procedure Laterality Date    ATRIAL ABLATION SURGERY      Supraventricular Tachycardia    CHOLECYSTECTOMY      2006    TOOTH EXTRACTION         Current Outpatient Medications   Medication Sig Dispense Refill    Biotin 5000 MCG CAPS Take 1 capsule by mouth daily      drospirenone-ethinyl estradiol (Gianvi) 3-0 02 MG per tablet Take 1 tablet by mouth daily 84 tablet 3    Fexofenadine HCl (ALLEGRA PO) Take 1 tablet by mouth daily      fluticasone (FLONASE) 50 mcg/act nasal spray 1 spray into each nostril daily      albuterol (VENTOLIN HFA) 90 mcg/act inhaler Inhale 1-2 puffs       No current facility-administered medications for this visit  Allergies   Allergen Reactions    Cat Hair Extract     Dust Mite Extract     Molds & Smuts     Penicillins Rash       Review of Systems   Constitutional: Negative  HENT: Negative  Eyes: Negative  Respiratory: Negative  Cardiovascular: Positive for palpitations  Gastrointestinal: Negative  Musculoskeletal: Negative  Neurological: Negative  Vitals:    01/14/21 1033   Pulse: 81   Weight: 8 437 kg (18 lb 9 6 oz)   Height: 5' 8 5" (1 74 m)         I spent 15 minutes directly with the patient during this visit    VIRTUAL VISIT DISCLAIMER    Alanna Velez acknowledges that she has consented to an online visit or consultation  She understands that the online visit is based solely on information provided by her, and that, in the absence of a face-to-face physical evaluation by the physician, the diagnosis she receives is both limited and provisional in terms of accuracy and completeness  This is not intended to replace a full medical face-to-face evaluation by the physician  Alanna Velez understands and accepts these terms

## 2021-01-19 PROBLEM — R00.2 PALPITATION: Status: ACTIVE | Noted: 2021-01-19

## 2021-02-02 ENCOUNTER — TELEPHONE (OUTPATIENT)
Dept: OTHER | Facility: OTHER | Age: 41
End: 2021-02-02

## 2021-02-02 NOTE — TELEPHONE ENCOUNTER
Dung Jimenez from [address] [place] [address], patient Taj Muro [birth year], patient is calling with toothache/earache for the last couple days  Denies any other symptoms  Patient is wondering if you could call something into her pharmacy? How should I advise patient?     Joshua Damian from Dr Kanchan Villanueva  Have her schedule with me tomorrow; she can call office and I will work her in to schedule; if she needs attention today have her go to  [address] [place] [address] patient of Dr Marco A Guillory request, patient verbalized understanding

## 2021-02-03 ENCOUNTER — TELEMEDICINE (OUTPATIENT)
Dept: INTERNAL MEDICINE CLINIC | Facility: CLINIC | Age: 41
End: 2021-02-03
Payer: COMMERCIAL

## 2021-02-03 DIAGNOSIS — K04.7 DENTAL INFECTION: Primary | ICD-10-CM

## 2021-02-03 PROCEDURE — 99213 OFFICE O/P EST LOW 20 MIN: CPT | Performed by: INTERNAL MEDICINE

## 2021-02-03 RX ORDER — CLINDAMYCIN HYDROCHLORIDE 300 MG/1
300 CAPSULE ORAL 3 TIMES DAILY
Qty: 21 CAPSULE | Refills: 0 | Status: SHIPPED | OUTPATIENT
Start: 2021-02-03 | End: 2021-02-10

## 2021-02-03 NOTE — PROGRESS NOTES
Virtual Regular Visit      Assessment/Plan:    Problem List Items Addressed This Visit        Digestive    Dental infection - Primary      Left incisor infection /dental infection with radiation of the pain to the left ear patient is penicillin allergic not pregnant not nursing will start clindamycin 300 mg t i d  x7 days I did review the side effects including C diff and diarrhea if she develops any diarrhea please notify me immediately also she should take a probiotic while on this antibiotic  I would like her to see a dentist as soon as possible for evaluation of the tooth, x-ray and further treatment plan  She can follow up with me p r n  but if she has any problems at all or any problems with the antibiotic or if she develops any diarrhea please notify me immediately  RTO p r n  call if any problems         Relevant Medications    clindamycin (CLEOCIN) 300 MG capsule               Reason for visit is   Chief Complaint   Patient presents with    Virtual Regular Visit        Encounter provider Mahnaz Medrano DO    Provider located at [address] [place] [id number]      Recent Visits  No visits were found meeting these conditions  Showing recent visits within past 7 days and meeting all other requirements     Today's Visits  Date Type Provider Dept   02/03/21 Telemedicine [address] today's visits and meeting all other requirements     Future Appointments  No visits were found meeting these conditions  Showing future appointments within next 150 days and meeting all other requirements        The patient was identified by name and date of birth  Humphrey Star was informed that this is a telemedicine visit and that the visit is being conducted through [facility] and patient was informed that this is a secure, HIPAA-compliant platform  She agrees to proceed     My office door was closed   No one else was in the room  She acknowledged consent and understanding of privacy and security of the video platform  The patient has agreed to participate and understands they can discontinue the visit at any time  Patient is aware this is a billable service  Ritika Murrieta is a 45 y o  female  Dental infection  HPI  73-year-old female coming in with the chief complaint dental infection she reports to me that she has developed Left upper incisor  pain for a couple day , she has been having difficulty finding a dentist therefore she has called the primary care doctor for an antibiotic prior to seeing the dentist   She is currently in process of finding a dentist so she can get x-ray and further evaluation   it is a cap , pain with pressing tooth gum is white, radiates to the left ear and using tylenol and motrin, helps pain with not pregnant not nursing pain since sunday    Past Medical History:   Diagnosis Date    Asthma     Last Assessed: 1/17/2018    Blood type, Rh negative     Kidney stones     Palpitations     Sciatica     Last Assessed: 12/10/2012    Shortness of breath     Last Assessed: 10/23/2012    [facility]        Past Surgical History:   Procedure Laterality Date    ATRIAL ABLATION SURGERY      Supraventricular Tachycardia    CHOLECYSTECTOMY      2006    TOOTH EXTRACTION         Current Outpatient Medications   Medication Sig Dispense Refill    albuterol (VENTOLIN HFA) 90 mcg/act inhaler Inhale 1-2 puffs      Biotin 5000 MCG CAPS Take 1 capsule by mouth daily      drospirenone-ethinyl estradiol (Gianvi) 3-0 02 MG per tablet Take 1 tablet by mouth daily 84 tablet 3    Fexofenadine HCl (ALLEGRA PO) Take 1 tablet by mouth daily      fluticasone (FLONASE) 50 mcg/act nasal spray 1 spray into each nostril daily      clindamycin (CLEOCIN) 300 MG capsule Take 1 capsule (300 mg total) by mouth 3 (three) times a day for 7 days 21 capsule 0     No current facility-administered medications for this visit  Allergies   Allergen Reactions    Cat Hair Extract     Dust Mite Extract     Molds & Smuts     Penicillins Rash       Review of Systems   HENT: Positive for ear pain  Tooth pain, gum pain   Gastrointestinal: Negative for diarrhea  Video Exam    There were no vitals filed for this visit  Physical Exam  There is resection of the gun and a slight white plaque left gum line left upper incisor no erythema    I spent 15 minutes directly with the patient during this visit      [facility] acknowledges that she has consented to an online visit or consultation  She understands that the online visit is based solely on information provided by her, and that, in the absence of a face-to-face physical evaluation by the physician, the diagnosis she receives is both limited and provisional in terms of accuracy and completeness  This is not intended to replace a full medical face-to-face evaluation by the physician  Liborio Alvarez understands and accepts these terms

## 2021-02-03 NOTE — ASSESSMENT & PLAN NOTE
Left incisor infection /dental infection with radiation of the pain to the left ear patient is penicillin allergic not pregnant not nursing will start clindamycin 300 mg t i d  x7 days I did review the side effects including C diff and diarrhea if she develops any diarrhea please notify me immediately also she should take a probiotic while on this antibiotic  I would like her to see a dentist as soon as possible for evaluation of the tooth, x-ray and further treatment plan  She can follow up with me p r n  but if she has any problems at all or any problems with the antibiotic or if she develops any diarrhea please notify me immediately    RTO p r n  call if any problems

## 2021-02-26 ENCOUNTER — TELEMEDICINE (OUTPATIENT)
Dept: INTERNAL MEDICINE CLINIC | Facility: CLINIC | Age: 41
End: 2021-02-26
Payer: COMMERCIAL

## 2021-02-26 VITALS — WEIGHT: 184 LBS | HEART RATE: 71 BPM | HEIGHT: 69 IN | BODY MASS INDEX: 27.25 KG/M2

## 2021-02-26 DIAGNOSIS — H10.32 ACUTE CONJUNCTIVITIS OF LEFT EYE, UNSPECIFIED ACUTE CONJUNCTIVITIS TYPE: Primary | ICD-10-CM

## 2021-02-26 PROCEDURE — 99213 OFFICE O/P EST LOW 20 MIN: CPT | Performed by: NURSE PRACTITIONER

## 2021-02-26 PROCEDURE — 3725F SCREEN DEPRESSION PERFORMED: CPT | Performed by: NURSE PRACTITIONER

## 2021-02-26 PROCEDURE — 3008F BODY MASS INDEX DOCD: CPT | Performed by: NURSE PRACTITIONER

## 2021-02-26 RX ORDER — TOBRAMYCIN AND DEXAMETHASONE 3; 1 MG/ML; MG/ML
1 SUSPENSION/ DROPS OPHTHALMIC
Qty: 5 ML | Refills: 0 | Status: SHIPPED | OUTPATIENT
Start: 2021-02-26 | End: 2021-08-19

## 2021-02-26 RX ORDER — IBUPROFEN 600 MG/1
600 TABLET ORAL EVERY 6 HOURS PRN
COMMUNITY
Start: 2021-02-16 | End: 2021-08-19

## 2021-02-26 NOTE — PROGRESS NOTES
Virtual Brief Visit    Assessment/Plan:    Problem List Items Addressed This Visit     None      Visit Diagnoses     Acute conjunctivitis of left eye, unspecified acute conjunctivitis type    -  Primary    Relevant Medications    tobramycin-dexamethasone (TOBRADEX) ophthalmic suspension                Reason for visit is   Chief Complaint   Patient presents with    Virtual Brief Visit        Encounter provider JOHAN Hudson    Provider located at [address] [place] [id number]    Recent Visits  No visits were found meeting these conditions  Showing recent visits within past 7 days and meeting all other requirements     Today's Visits  Date Type Provider Dept   02/26/21 Telemedicine Maeve Hudson today's visits and meeting all other requirements     Future Appointments  No visits were found meeting these conditions  Showing future appointments within next 150 days and meeting all other requirements        After connecting through {AMB VIRTUAL VISIT RCFLZZ:49784}, the patient was identified by name and date of birth  Waldon Spatz was informed that this is a telemedicine visit and that the visit is being conducted through {AMB CORONAVIRUS VISIT YTSSUJ:35745}  {Telemedicine confidentiality :95877} {Telemedicine participants:56576}  She acknowledged consent and understanding of privacy and security of the platform  The patient has agreed to participate and understands she can discontinue the visit at any time  Patient is aware this is a billable service  Subjective    Waldon Spatz is a 45 y o  female ***    HPI     Past Medical History:   Diagnosis Date    Asthma     Last Assessed: 1/17/2018    Blood type, Rh negative     Kidney stones     Palpitations     Sciatica     Last Assessed: 12/10/2012    Shortness of breath     Last Assessed: 10/23/2012    SVT (supraventricular tachycardia) ([facility] [address] ) Past Surgical History:   Procedure Laterality Date    ATRIAL ABLATION SURGERY      Supraventricular Tachycardia    CHOLECYSTECTOMY      2006    TOOTH EXTRACTION         Current Outpatient Medications   Medication Sig Dispense Refill    Biotin 5000 MCG CAPS Take 1 capsule by mouth daily      drospirenone-ethinyl estradiol (Gianvi) 3-0 02 MG per tablet Take 1 tablet by mouth daily 84 tablet 3    Fexofenadine HCl (ALLEGRA PO) Take 1 tablet by mouth daily      fluticasone (FLONASE) 50 mcg/act nasal spray 1 spray into each nostril daily      ibuprofen (MOTRIN) 600 mg tablet Take 600 mg by mouth every 6 (six) hours as needed      Probiotic Product (SOLUBLE FIBER/PROBIOTICS PO) Take 2 tablets by mouth      albuterol (VENTOLIN HFA) 90 mcg/act inhaler Inhale 1-2 puffs      tobramycin-dexamethasone (TOBRADEX) ophthalmic suspension Administer 1 drop into the left eye every 4 (four) hours while awake 5 mL 0     No current facility-administered medications for this visit  Allergies   Allergen Reactions    Cat Hair Extract     Dust Mite Extract     Molds & Smuts     Penicillins Rash       Review of Systems    Vitals:    02/26/21 1422   Pulse: 71   Weight: 83 5 kg (184 lb)   Height: 5' 8 5" (1 74 m)         {covid time spent:84894}    VIRTUAL VISIT DISCLAIMER    Donnell Abbott acknowledges that she has consented to an online visit or consultation  She understands that the online visit is based solely on information provided by her, and that, in the absence of a face-to-face physical evaluation by the physician, the diagnosis she receives is both limited and provisional in terms of accuracy and completeness  This is not intended to replace a full medical face-to-face evaluation by the physician  Donnell Abbott understands and accepts these terms

## 2021-02-26 NOTE — PROGRESS NOTES
Virtual Regular Visit      Assessment/Plan:    Problem List Items Addressed This Visit        Other    Acute conjunctivitis of left eye - Primary     Start antibiotics eye drops         Relevant Medications    tobramycin-dexamethasone (TOBRADEX) ophthalmic suspension               Reason for visit is   Chief Complaint   Patient presents with    Virtual Brief Visit    Virtual Regular Visit        Encounter provider JOHAN Spencer    Provider located at [address] [place] [phone]      Recent Visits  No visits were found meeting these conditions  Showing recent visits within past 7 days and meeting all other requirements     Today's Visits  Date Type Provider Dept   02/26/21 Telemedicine Maeve Spencer today's visits and meeting all other requirements     Future Appointments  No visits were found meeting these conditions  Showing future appointments within next 150 days and meeting all other requirements        The patient was identified by name and date of birth  Emilie Nicholas was informed that this is a telemedicine visit and that the visit is being conducted through [facility] and patient was informed that this is a secure, HIPAA-compliant platform  She agrees to proceed     My office door was closed  No one else was in the room  She acknowledged consent and understanding of privacy and security of the video platform  The patient has agreed to participate and understands they can discontinue the visit at any time  Patient is aware this is a billable service  Subjective  Emilie Nicholas is a 45 y o  female          Patient woke up today with a red left eye  No discharge or tearing  Slight pain         Past Medical History:   Diagnosis Date    Asthma     Last Assessed: 1/17/2018    Blood type, Rh negative     Kidney stones     Palpitations     Sciatica     Last Assessed: 12/10/2012    Shortness of breath     Last Assessed: 10/23/2012    SVT (supraventricular tachycardia) ([facility])        Past Surgical History:   Procedure Laterality Date    ATRIAL ABLATION SURGERY      Supraventricular Tachycardia    CHOLECYSTECTOMY      2006    TOOTH EXTRACTION         Current Outpatient Medications   Medication Sig Dispense Refill    Biotin 5000 MCG CAPS Take 1 capsule by mouth daily      drospirenone-ethinyl estradiol (Gianvi) 3-0 02 MG per tablet Take 1 tablet by mouth daily 84 tablet 3    Fexofenadine HCl (ALLEGRA PO) Take 1 tablet by mouth daily      fluticasone (FLONASE) 50 mcg/act nasal spray 1 spray into each nostril daily      ibuprofen (MOTRIN) 600 mg tablet Take 600 mg by mouth every 6 (six) hours as needed      Probiotic Product (SOLUBLE FIBER/PROBIOTICS PO) Take 2 tablets by mouth      albuterol (VENTOLIN HFA) 90 mcg/act inhaler Inhale 1-2 puffs      tobramycin-dexamethasone (TOBRADEX) ophthalmic suspension Administer 1 drop into the left eye every 4 (four) hours while awake 5 mL 0     No current facility-administered medications for this visit  Allergies   Allergen Reactions    Cat Hair Extract     Dust Mite Extract     Molds & Smuts     Penicillins Rash       Review of Systems   Constitutional: Negative  HENT: Negative  Eyes: Negative  Respiratory: Negative  Cardiovascular: Negative  Gastrointestinal: Negative  Musculoskeletal: Negative  Neurological: Negative  Video Exam    Vitals:    02/26/21 1422   Pulse: 71   Weight: 83 5 kg (184 lb)   Height: 5' 8 5" (1 74 m)       Physical Exam  Vitals signs reviewed  Constitutional:       Appearance: Normal appearance  Eyes:     Neurological:      Mental Status: She is alert  BMI Counseling: Body mass index is 27 57 kg/m²  The BMI is above normal  Nutrition recommendations include decreasing overall calorie intake      I spent 15 minutes directly with the patient during this visit      [facility] that she has consented to an online visit or consultation  She understands that the online visit is based solely on information provided by her, and that, in the absence of a face-to-face physical evaluation by the physician, the diagnosis she receives is both limited and provisional in terms of accuracy and completeness  This is not intended to replace a full medical face-to-face evaluation by the physician  Royal Pathak understands and accepts these terms

## 2021-03-08 ENCOUNTER — TELEPHONE (OUTPATIENT)
Dept: INTERNAL MEDICINE CLINIC | Facility: CLINIC | Age: 41
End: 2021-03-08

## 2021-03-08 DIAGNOSIS — H57.89 RED EYE: Primary | ICD-10-CM

## 2021-03-08 NOTE — TELEPHONE ENCOUNTER
Pt is calling back to update you on her eye  It is not itchy and it doesn't hurt  It is not watering  Just red in the corner of her L eye  Pt used the eye drops for a full week  Asking what else she can do for this        Please advise

## 2021-04-19 ENCOUNTER — HOSPITAL ENCOUNTER (OUTPATIENT)
Dept: MAMMOGRAPHY | Facility: CLINIC | Age: 41
Discharge: HOME | End: 2021-04-19
Payer: COMMERCIAL

## 2021-04-19 VITALS — WEIGHT: 184 LBS | HEIGHT: 69 IN | BODY MASS INDEX: 27.25 KG/M2

## 2021-04-19 DIAGNOSIS — Z12.31 ENCOUNTER FOR SCREENING MAMMOGRAM FOR MALIGNANT NEOPLASM OF BREAST: ICD-10-CM

## 2021-04-19 PROCEDURE — 77063 BREAST TOMOSYNTHESIS BI: CPT

## 2021-04-19 PROCEDURE — 77067 SCR MAMMO BI INCL CAD: CPT

## 2021-04-20 ENCOUNTER — TELEPHONE (OUTPATIENT)
Dept: OBGYN CLINIC | Facility: CLINIC | Age: 41
End: 2021-04-20

## 2021-08-11 ENCOUNTER — TELEPHONE (OUTPATIENT)
Dept: OBGYN CLINIC | Facility: CLINIC | Age: 41
End: 2021-08-11

## 2021-08-11 ENCOUNTER — ANNUAL EXAM (OUTPATIENT)
Dept: OBGYN CLINIC | Facility: CLINIC | Age: 41
End: 2021-08-11
Payer: COMMERCIAL

## 2021-08-11 VITALS
BODY MASS INDEX: 28.94 KG/M2 | WEIGHT: 184.4 LBS | DIASTOLIC BLOOD PRESSURE: 86 MMHG | SYSTOLIC BLOOD PRESSURE: 104 MMHG | HEIGHT: 67 IN

## 2021-08-11 DIAGNOSIS — Z30.41 ENCOUNTER FOR SURVEILLANCE OF CONTRACEPTIVE PILLS: ICD-10-CM

## 2021-08-11 DIAGNOSIS — Z12.31 ENCOUNTER FOR SCREENING MAMMOGRAM FOR MALIGNANT NEOPLASM OF BREAST: Primary | ICD-10-CM

## 2021-08-11 PROCEDURE — S0612 ANNUAL GYNECOLOGICAL EXAMINA: HCPCS | Performed by: OBSTETRICS & GYNECOLOGY

## 2021-08-11 RX ORDER — DROSPIRENONE AND ETHINYL ESTRADIOL 0.02-3(28)
1 KIT ORAL DAILY
Qty: 84 TABLET | Refills: 3 | Status: SHIPPED | OUTPATIENT
Start: 2021-08-11 | End: 2022-07-11

## 2021-08-11 NOTE — TELEPHONE ENCOUNTER
[id number] Adrienne Woodson with me for her to still come because she is not due for pap  She can cancel if she uncomfortable with exam on period

## 2021-08-11 NOTE — TELEPHONE ENCOUNTER
Pt last annual 07/23/20- with stone CTRV  Pt over due for yearly  Pt schedule for today as she needs her refill  Pt wanted provider to know she has her cycle that started today

## 2021-08-11 NOTE — TELEPHONE ENCOUNTER
Pt called in to see if she can get refill sent out for [facility] was givne Gianvi brand but pharmacy gave her generic;which is ok   Ty  Pt would like to be called when it is sent out [phone]

## 2021-08-12 ENCOUNTER — RA CDI HCC (OUTPATIENT)
Dept: OTHER | Facility: HOSPITAL | Age: 41
End: 2021-08-12

## 2021-08-12 NOTE — PROGRESS NOTES
Assessment/Plan:    1  Encounter for screening mammogram for malignant neoplasm of breast    - Mammo screening bilateral w 3d & cad; Future    2  Encounter for surveillance of contraceptive pills    - drospirenone-ethinyl estradiol (Gianvi) 3-0 02 MG per tablet; Take 1 tablet by mouth daily  Dispense: 84 tablet; Refill: 3          Subjective      Whitney Manjarrez is a 37 y o  female who presents for annual exam  Periods are regular every 12 weeks, lasting 3 days  Dysmenorrhea:none  Cyclic symptoms include none  No intermenstrual bleeding, spotting, or discharge  The patient denies any urinary or sexual concerns  Current contraception: OCP (estrogen/progesterone)  History of abnormal Pap smear: no  Regular self breast exam: yes  History of abnormal mammogram: no  History of abnormal lipids: no  Menstrual History:  OB History        3    Para   2    Term   2            AB   1    Living   2       SAB   1    TAB        Ectopic        Multiple        Live Births   2           Obstetric Comments   Menarche - 16 yo                Patient's last menstrual period was 2021 (exact date)    Period Cycle (Days):  (Light menses q 3 months)  Period Duration (Days): 2-3  Period Pattern: Regular  Menstrual Flow: Light  Dysmenorrhea: None    Past Medical History:   Diagnosis Date    Asthma     Last Assessed: 2018    Blood type, Rh negative     Kidney stones     Palpitations     Sciatica     Last Assessed: 12/10/2012    Shortness of breath     Last Assessed: 10/23/2012    SVT [facility]supraventricular tachycardia) ([facility] [address] )        Family History   Problem Relation Age of Onset    Diabetes Mother     Hypertension Mother     Thyroid disease Mother     Hypothyroidism Mother     Diabetes Father     Kidney cancer Father    Michael Raisin' disease Father     Hyperthyroidism Father     Prostate cancer Brother     Skin cancer Maternal Grandfather     Prostate cancer Maternal Grandfather     Heart attack Paternal Grandfather     Stroke Other         CVA    No Known Problems Sister     No Known Problems Daughter     No Known Problems Maternal Grandmother     No Known Problems Paternal Grandmother     Colon cancer Cousin 36       The following portions of the patient's history were reviewed and updated as appropriate: allergies, current medications, past family history, past medical history, past social history, past surgical history and problem list     Review of Systems  Pertinent items are noted in HPI  Objective      /86 (BP Location: Right arm, Patient Position: Sitting, Cuff Size: Standard)   Ht 5' 7 25" (1 708 m)   Wt 83 6 kg (184 lb 6 4 oz)   LMP 08/11/2021 (Exact Date)   BMI 28 67 kg/m²     General:   alert and oriented, in no acute distress   Heart:    Breasts: regular rate and rhythm, S1, S2 normal, no murmur, click, rub or gallop   appear normal, no suspicious masses, no skin or nipple changes or axillary nodes     Lungs: clear to auscultation bilaterally   Abdomen: soft, non-tender, without masses or organomegaly   Vulva: normal   Vagina: normal mucosa   Cervix: no lesions   Uterus: normal size, mobile, non-tender   Adnexa: normal adnexa and no mass, fullness, tenderness

## 2021-08-12 NOTE — PROGRESS NOTES
Jorge Luis [facility]  coding opportunities       Chart reviewed, no opportunity found: CHART REVIEWED, NO OPPORTUNITY FOUND                        Patients insurance company: [employer] (Medicare Advantage and Commercial)

## 2021-08-19 ENCOUNTER — OFFICE VISIT (OUTPATIENT)
Dept: INTERNAL MEDICINE CLINIC | Facility: CLINIC | Age: 41
End: 2021-08-19
Payer: COMMERCIAL

## 2021-08-19 VITALS
BODY MASS INDEX: 29.6 KG/M2 | HEIGHT: 67 IN | RESPIRATION RATE: 16 BRPM | OXYGEN SATURATION: 100 % | WEIGHT: 188.6 LBS | DIASTOLIC BLOOD PRESSURE: 82 MMHG | HEART RATE: 68 BPM | SYSTOLIC BLOOD PRESSURE: 112 MMHG

## 2021-08-19 DIAGNOSIS — I47.1 PAROXYSMAL SVT (SUPRAVENTRICULAR TACHYCARDIA) (HCC): ICD-10-CM

## 2021-08-19 DIAGNOSIS — Z00.00 WELLNESS EXAMINATION: Primary | ICD-10-CM

## 2021-08-19 DIAGNOSIS — Z11.59 NEED FOR HEPATITIS C SCREENING TEST: ICD-10-CM

## 2021-08-19 DIAGNOSIS — Z11.4 SCREENING FOR HIV (HUMAN IMMUNODEFICIENCY VIRUS): ICD-10-CM

## 2021-08-19 DIAGNOSIS — Z23 ENCOUNTER FOR IMMUNIZATION: ICD-10-CM

## 2021-08-19 PROBLEM — I47.10 PAROXYSMAL SVT (SUPRAVENTRICULAR TACHYCARDIA): Status: ACTIVE | Noted: 2021-08-19

## 2021-08-19 PROCEDURE — 1036F TOBACCO NON-USER: CPT | Performed by: INTERNAL MEDICINE

## 2021-08-19 PROCEDURE — 3008F BODY MASS INDEX DOCD: CPT | Performed by: INTERNAL MEDICINE

## 2021-08-19 PROCEDURE — 99214 OFFICE O/P EST MOD 30 MIN: CPT | Performed by: INTERNAL MEDICINE

## 2021-08-20 NOTE — ASSESSMENT & PLAN NOTE
Given patient has a history of SVT status post ablation  Patient is having recurrence of symptoms of SVT       · Patient will benefit from [address] study  · Patient also given ambulatory referral to electrophysiology  · Will obtain echo to rule out structural defect

## 2021-08-20 NOTE — ASSESSMENT & PLAN NOTE
Patient has been doing well overall episodes for palpitations  Patient has a balanced diet NS exercising appropriately  Patient's vaccines are all up-to-date  Patient also obtain COVID vaccine  Patient will return for annual flu vaccine  Will obtain yearly lab works

## 2021-08-20 NOTE — PROGRESS NOTES
Assessment/Plan:    History Paroxysmal SVT (supraventricular tachycardia) (HCC)  Given patient has a history of SVT status post ablation  Patient is having recurrence of symptoms of SVT  · Patient will benefit from [address] study  · Patient also given ambulatory referral to electrophysiology  · Will obtain echo to rule out structural defect    Wellness examination  Patient has been doing well overall episodes for palpitations  Patient has a balanced diet NS exercising appropriately  Patient's vaccines are all up-to-date  Patient also obtain COVID vaccine  Patient will return for annual flu vaccine  Will obtain yearly lab works  Diagnoses and all orders for this visit:    Wellness examination  -     Hepatitis C Antibody ([facility]); Future  -     HIV 1/2 Antigen/Antibody (4th Generation) w Reflex [facility]; Future  -     CBC and differential; Future  -     Comprehensive metabolic panel; Future  -     Lipid panel; Future  -     Hemoglobin A1C With EAG; Future  -     TSH, 3rd generation with Free T4 reflex; Future    Need for hepatitis C screening test  -     Hepatitis C Antibody ([facility]); Future    Encounter for immunization    Screening for HIV (human immunodeficiency virus)  -     HIV 1/2 Antigen/Antibody (4th Generation) w Reflex [facility]; Future    History Paroxysmal SVT (supraventricular tachycardia) (HCC)  -     Echo complete with contrast if indicated; Future  -     POCT ECG  -     Ambulatory referral to Cardiac Electrophysiology; Future  -     Cardiac event monitor; Future    Other orders  -     Cancel: PNEUMOCOCCAL POLYSACCHARIDE VACCINE 23-VALENT =>1YO SQ IM          Subjective:      Patient ID: Evonne Covarrubias is a 37 y o  female  With past medical history of SVT s/p ablation in 2009  Patient is here for regular follow-up  Patient states that over the last urine half she has been having multiple episodes of palpitations    Patient states that every episode of palpitation has had a trigger  Patient had a traumatic event which her sister-in-law had a cardiac arrest with eventual resuscitation  Patient subsequently Cortney developed severe palpitations that night that lasted roughly 14-18 hours, and states that her heart rate was in the 120s during this time  Patient did not have any other symptoms at that particular time  Denies any chest pain or shortness of breath with any further episodes  Patient also expressed that she had another episode that lasted only a few hours morning after she had gone on vacation, after which she did have a few cold alcoholic beverages the night before  Recently in March patient had an episode where she started having severe palpitations and had to go to the ED [facility], where she started feeling dizzy and was started on meclizine  Patient has no more episodes of dizziness/vertigo since then, and is no longer requiring meclizine  Palpitations  This is a chronic problem  The current episode started more than 1 year ago  The problem occurs intermittently  The problem has been waxing and waning  Pertinent negatives include no abdominal pain, chest pain, chills, coughing, headaches, joint swelling, rash or sore throat  Exacerbated by: Associated with coffee  She has tried nothing for the symptoms  The following portions of the patient's history were reviewed and updated as appropriate: allergies, current medications, past family history, past medical history, past social history, past surgical history and problem list     Review of Systems   Constitutional: Negative for activity change, appetite change and chills  HENT: Negative for rhinorrhea, sinus pain and sore throat  Eyes: Negative for visual disturbance  Respiratory: Negative for cough, chest tightness and shortness of breath  Cardiovascular: Positive for palpitations  Negative for chest pain  Gastrointestinal: Negative for abdominal distention and abdominal pain  Endocrine: Negative for polyphagia  Genitourinary: Negative for dysuria, enuresis, frequency and menstrual problem  Musculoskeletal: Negative for joint swelling  Skin: Negative for pallor and rash  Neurological: Negative for dizziness, facial asymmetry, light-headedness and headaches  Psychiatric/Behavioral: Negative  Objective:      /82   Pulse 68   Resp 16   Ht 5' 7 25" (1 708 m)   Wt 85 5 kg (188 lb 9 6 oz)   LMP 08/11/2021 (Exact Date)   SpO2 100%   BMI 29 32 kg/m²          Physical Exam  Vitals reviewed  Constitutional:       General: She is not in acute distress  Appearance: Normal appearance  HENT:      Head: Normocephalic and atraumatic  Nose: No congestion or rhinorrhea  Mouth/Throat:      Pharynx: Oropharynx is clear  Eyes:      General: No scleral icterus  Right eye: No discharge  Left eye: No discharge  Extraocular Movements: Extraocular movements intact  Conjunctiva/sclera: Conjunctivae normal       Pupils: Pupils are equal, round, and reactive to light  Cardiovascular:      Rate and Rhythm: Normal rate and regular rhythm  Pulses: Normal pulses  Heart sounds: Normal heart sounds  No murmur heard  No gallop  Pulmonary:      Effort: Pulmonary effort is normal  No respiratory distress  Breath sounds: No wheezing or rales  Abdominal:      General: Abdomen is flat  Bowel sounds are normal  There is no distension  Palpations: Abdomen is soft  Tenderness: There is no abdominal tenderness  Musculoskeletal:         General: No swelling, tenderness or deformity  Right lower leg: No edema  Left lower leg: No edema  Skin:     General: Skin is warm and dry  Capillary Refill: Capillary refill takes less than 2 seconds  Coloration: Skin is not pale  Findings: No bruising, erythema or rash  Neurological:      General: No focal deficit present        Mental Status: She is alert and oriented to person, place, and time  Cranial Nerves: No cranial nerve deficit  Motor: No weakness  Psychiatric:         Mood and Affect: Mood normal          Behavior: Behavior normal          Thought Content:  Thought content normal          Judgment: Judgment normal

## 2021-08-26 ENCOUNTER — TELEPHONE (OUTPATIENT)
Dept: CARDIOLOGY CLINIC | Facility: CLINIC | Age: 41
End: 2021-08-26

## 2021-08-26 DIAGNOSIS — I47.1 PSVT (PAROXYSMAL SUPRAVENTRICULAR TACHYCARDIA) (HCC): Primary | ICD-10-CM

## 2021-09-14 ENCOUNTER — HOSPITAL ENCOUNTER (OUTPATIENT)
Dept: NON INVASIVE DIAGNOSTICS | Age: 41
Discharge: HOME | End: 2021-09-14
Payer: COMMERCIAL

## 2021-09-14 DIAGNOSIS — I47.1 PAROXYSMAL SVT (SUPRAVENTRICULAR TACHYCARDIA) (HCC): ICD-10-CM

## 2021-09-14 PROCEDURE — 93306 TTE W/DOPPLER COMPLETE: CPT

## 2021-09-14 PROCEDURE — 93306 TTE W/DOPPLER COMPLETE: CPT | Performed by: INTERNAL MEDICINE

## 2021-09-20 ENCOUNTER — CLINICAL SUPPORT (OUTPATIENT)
Dept: CARDIOLOGY CLINIC | Facility: CLINIC | Age: 41
End: 2021-09-20
Payer: COMMERCIAL

## 2021-09-20 DIAGNOSIS — I47.1 PSVT (PAROXYSMAL SUPRAVENTRICULAR TACHYCARDIA) (HCC): ICD-10-CM

## 2021-09-20 PROCEDURE — 93246 EXT ECG>7D<15D RECORDING: CPT | Performed by: INTERNAL MEDICINE

## 2021-09-20 NOTE — RESULT ENCOUNTER NOTE
Normal Device Function    Recording for 12 days Predominant rhythm is sinus rhythm Minimum heart rate 45 per minute Maximum heart rate 178 per minuteAverage heart rate 75 per minute    No VT No AFib No significant PACNo significant PVC

## 2021-10-01 ENCOUNTER — OFFICE VISIT (OUTPATIENT)
Dept: CARDIOLOGY CLINIC | Facility: CLINIC | Age: 41
End: 2021-10-01
Payer: COMMERCIAL

## 2021-10-01 VITALS
DIASTOLIC BLOOD PRESSURE: 82 MMHG | SYSTOLIC BLOOD PRESSURE: 122 MMHG | HEIGHT: 67 IN | BODY MASS INDEX: 28.9 KG/M2 | HEART RATE: 74 BPM | WEIGHT: 184.1 LBS

## 2021-10-01 DIAGNOSIS — E66.3 OVERWEIGHT (BMI 25.0-29.9): ICD-10-CM

## 2021-10-01 DIAGNOSIS — G47.19 EXCESSIVE DAYTIME SLEEPINESS: ICD-10-CM

## 2021-10-01 DIAGNOSIS — R00.2 PALPITATION: ICD-10-CM

## 2021-10-01 DIAGNOSIS — G47.39 OTHER SLEEP APNEA: ICD-10-CM

## 2021-10-01 DIAGNOSIS — J45.20 MILD INTERMITTENT ASTHMA WITHOUT COMPLICATION: ICD-10-CM

## 2021-10-01 DIAGNOSIS — I47.1 PAROXYSMAL SVT (SUPRAVENTRICULAR TACHYCARDIA) (HCC): Primary | ICD-10-CM

## 2021-10-01 PROCEDURE — 93000 ELECTROCARDIOGRAM COMPLETE: CPT | Performed by: INTERNAL MEDICINE

## 2021-10-01 PROCEDURE — 99244 OFF/OP CNSLTJ NEW/EST MOD 40: CPT | Performed by: INTERNAL MEDICINE

## 2021-10-01 PROCEDURE — 3008F BODY MASS INDEX DOCD: CPT | Performed by: NURSE PRACTITIONER

## 2021-10-03 PROBLEM — E66.3 OVERWEIGHT (BMI 25.0-29.9): Status: ACTIVE | Noted: 2021-10-03

## 2021-10-03 PROBLEM — G47.39 OTHER SLEEP APNEA: Status: ACTIVE | Noted: 2021-10-03

## 2021-10-05 ENCOUNTER — TELEPHONE (OUTPATIENT)
Dept: INTERNAL MEDICINE CLINIC | Facility: CLINIC | Age: 41
End: 2021-10-05

## 2021-10-06 ENCOUNTER — TELEMEDICINE (OUTPATIENT)
Dept: INTERNAL MEDICINE CLINIC | Facility: CLINIC | Age: 41
End: 2021-10-06
Payer: COMMERCIAL

## 2021-10-06 DIAGNOSIS — F41.9 ANXIETY AND DEPRESSION: Primary | ICD-10-CM

## 2021-10-06 DIAGNOSIS — F32.A ANXIETY AND DEPRESSION: Primary | ICD-10-CM

## 2021-10-06 PROCEDURE — 1036F TOBACCO NON-USER: CPT | Performed by: NURSE PRACTITIONER

## 2021-10-06 PROCEDURE — 99213 OFFICE O/P EST LOW 20 MIN: CPT | Performed by: NURSE PRACTITIONER

## 2021-10-06 RX ORDER — ESCITALOPRAM OXALATE 10 MG/1
10 TABLET ORAL DAILY
Qty: 30 TABLET | Refills: 2 | Status: SHIPPED | OUTPATIENT
Start: 2021-10-06 | End: 2022-01-02

## 2021-12-28 ENCOUNTER — TELEMEDICINE (OUTPATIENT)
Dept: INTERNAL MEDICINE CLINIC | Facility: CLINIC | Age: 41
End: 2021-12-28
Payer: COMMERCIAL

## 2021-12-28 DIAGNOSIS — R68.89 FLU-LIKE SYMPTOMS: Primary | ICD-10-CM

## 2021-12-28 DIAGNOSIS — J01.90 ACUTE NON-RECURRENT SINUSITIS, UNSPECIFIED LOCATION: ICD-10-CM

## 2021-12-28 PROCEDURE — 99213 OFFICE O/P EST LOW 20 MIN: CPT | Performed by: GENERAL ACUTE CARE HOSPITAL

## 2021-12-28 PROCEDURE — 1036F TOBACCO NON-USER: CPT | Performed by: GENERAL ACUTE CARE HOSPITAL

## 2022-01-02 DIAGNOSIS — F32.A ANXIETY AND DEPRESSION: ICD-10-CM

## 2022-01-02 DIAGNOSIS — F41.9 ANXIETY AND DEPRESSION: ICD-10-CM

## 2022-01-02 RX ORDER — ESCITALOPRAM OXALATE 10 MG/1
TABLET ORAL
Qty: 90 TABLET | Refills: 0 | Status: SHIPPED | OUTPATIENT
Start: 2022-01-02 | End: 2022-03-29

## 2022-03-29 DIAGNOSIS — F41.9 ANXIETY AND DEPRESSION: ICD-10-CM

## 2022-03-29 DIAGNOSIS — F32.A ANXIETY AND DEPRESSION: ICD-10-CM

## 2022-03-29 RX ORDER — ESCITALOPRAM OXALATE 10 MG/1
TABLET ORAL
Qty: 90 TABLET | Refills: 0 | Status: SHIPPED | OUTPATIENT
Start: 2022-03-29 | End: 2022-07-18 | Stop reason: SDUPTHER

## 2022-05-05 ENCOUNTER — HOSPITAL ENCOUNTER (OUTPATIENT)
Dept: MAMMOGRAPHY | Facility: IMAGING CENTER | Age: 42
Discharge: HOME | End: 2022-05-05
Payer: COMMERCIAL

## 2022-05-05 VITALS — WEIGHT: 184 LBS | HEIGHT: 68 IN | BODY MASS INDEX: 27.89 KG/M2

## 2022-05-05 DIAGNOSIS — Z12.31 ENCOUNTER FOR SCREENING MAMMOGRAM FOR MALIGNANT NEOPLASM OF BREAST: ICD-10-CM

## 2022-05-05 PROCEDURE — 77063 BREAST TOMOSYNTHESIS BI: CPT

## 2022-05-05 PROCEDURE — 77067 SCR MAMMO BI INCL CAD: CPT

## 2022-07-18 DIAGNOSIS — F41.9 ANXIETY AND DEPRESSION: ICD-10-CM

## 2022-07-18 DIAGNOSIS — F32.A ANXIETY AND DEPRESSION: ICD-10-CM

## 2022-07-18 RX ORDER — ESCITALOPRAM OXALATE 10 MG/1
10 TABLET ORAL DAILY
Qty: 90 TABLET | Refills: 1 | Status: SHIPPED | OUTPATIENT
Start: 2022-07-18

## 2022-07-18 NOTE — TELEPHONE ENCOUNTER
Medication Refill Request     Name  Lexapro   Dose/Frequency 10mg daily  Quantity  90   Verified pharmacy   [x]  Verified ordering Provider   [x]  Does patient have enough for the next 3 days?  Yes [x] No []

## 2022-09-20 ENCOUNTER — TELEPHONE (OUTPATIENT)
Dept: OBGYN CLINIC | Facility: CLINIC | Age: 42
End: 2022-09-20

## 2022-09-20 DIAGNOSIS — Z30.41 ENCOUNTER FOR SURVEILLANCE OF CONTRACEPTIVE PILLS: ICD-10-CM

## 2022-09-20 RX ORDER — DROSPIRENONE AND ETHINYL ESTRADIOL 0.02-3(28)
1 KIT ORAL DAILY
Qty: 84 TABLET | Refills: 0 | Status: SHIPPED | OUTPATIENT
Start: 2022-09-20

## 2022-09-20 NOTE — TELEPHONE ENCOUNTER
Patient needs her bc refilled- her last yearly was 8/11- and she is scheduled for 12/6 with Dr Bernie Blount for her next yearly

## 2022-09-20 NOTE — TELEPHONE ENCOUNTER
Forwarded new rx for her WILDER to Dr Daphne Sigala to disp 84 pills with no refills  She is already scheduled for her annual exam 12/22

## 2022-09-21 ENCOUNTER — RA CDI HCC (OUTPATIENT)
Dept: OTHER | Facility: HOSPITAL | Age: 42
End: 2022-09-21

## 2022-10-12 PROBLEM — H10.32 ACUTE CONJUNCTIVITIS OF LEFT EYE: Status: RESOLVED | Noted: 2021-02-26 | Resolved: 2022-10-12

## 2022-10-12 PROBLEM — J01.90 ACUTE NON-RECURRENT SINUSITIS: Status: RESOLVED | Noted: 2021-12-28 | Resolved: 2022-10-12

## 2022-12-06 ENCOUNTER — ANNUAL EXAM (OUTPATIENT)
Dept: OBGYN CLINIC | Facility: CLINIC | Age: 42
End: 2022-12-06

## 2022-12-06 VITALS
HEIGHT: 67 IN | DIASTOLIC BLOOD PRESSURE: 72 MMHG | WEIGHT: 195.6 LBS | SYSTOLIC BLOOD PRESSURE: 134 MMHG | BODY MASS INDEX: 30.7 KG/M2

## 2022-12-06 DIAGNOSIS — Z01.419 ENCOUNTER FOR ANNUAL ROUTINE GYNECOLOGICAL EXAMINATION: Primary | ICD-10-CM

## 2022-12-06 DIAGNOSIS — Z12.31 ENCOUNTER FOR SCREENING MAMMOGRAM FOR MALIGNANT NEOPLASM OF BREAST: ICD-10-CM

## 2022-12-06 DIAGNOSIS — Z30.41 ENCOUNTER FOR SURVEILLANCE OF CONTRACEPTIVE PILLS: ICD-10-CM

## 2022-12-06 DIAGNOSIS — Z11.51 SCREENING FOR HUMAN PAPILLOMAVIRUS (HPV): ICD-10-CM

## 2022-12-06 RX ORDER — DROSPIRENONE AND ETHINYL ESTRADIOL 0.02-3(28)
1 KIT ORAL DAILY
Qty: 84 TABLET | Refills: 3 | Status: SHIPPED | OUTPATIENT
Start: 2022-12-06

## 2022-12-07 NOTE — PROGRESS NOTES
Assessment/Plan:    1  Encounter for annual routine gynecological examination    - Liquid-based pap, screening    2  Screening for human papillomavirus (HPV)    - Liquid-based pap, screening    3  Encounter for screening mammogram for malignant neoplasm of breast    - Mammo screening bilateral w 3d & cad; Future    4  Encounter for surveillance of contraceptive pills    - drospirenone-ethinyl estradiol (Vestura) 3-0 02 MG per tablet; Take 1 tablet by mouth daily  Dispense: 84 tablet; Refill: 3        Subjective      Dennis Rider is a 40 y o  female who presents for annual exam  Periods are regular every 28-30 days, lasting 1 day  Dysmenorrhea:none  Cyclic symptoms:  none  No intermenstrual bleeding, spotting, or discharge  The patient denies any urinary or sexual concerns  Current contraception: OCP (estrogen/progesterone)  History of abnormal Pap smear: no  Regular self breast exam: yes  History of abnormal mammogram: no  History of abnormal lipids: no  Menstrual History:  OB History        3    Para   2    Term   2            AB   1    Living   2       SAB   1    IAB        Ectopic        Multiple        Live Births   2           Obstetric Comments   Menarche - 16 yo                Patient's last menstrual period was 2022 (exact date)    Period Cycle (Days): 28  Period Duration (Days): 1  Period Pattern: Regular  Menstrual Flow: Light  Menstrual Control: Panty liner    Past Medical History:   Diagnosis Date   • Asthma     Last Assessed: 2018   • Blood type, Rh negative    • Kidney stones    • Miscarriage 2007   • Palpitations    • Sciatica     Last Assessed: 12/10/2012   • Shortness of breath     Last Assessed: 10/23/2012   • SVT (supraventricular tachycardia) ([facility] [address] )    • Varicella        Family History   Problem Relation Age of Onset   • Diabetes Mother    • Hypertension Mother    • Thyroid disease Mother         hypo   • Hypothyroidism Mother    • Diabetes Father    • Kidney cancer Father    • Graves' disease Father    • Hyperthyroidism Father    • Cancer Father         Renal carcinoma, kidney removed   • Heart attack Father         2019, survived   • Thyroid disease Father         hyper   • Prostate cancer Brother    • Skin cancer Maternal Grandfather    • Prostate cancer Maternal Grandfather    • Heart attack Paternal Grandfather    • Stroke Other         CVA   • No Known Problems Sister    • No Known Problems Daughter    • No Known Problems Maternal Grandmother    • No Known Problems Paternal Grandmother    • Colon cancer Cousin 36       The following portions of the patient's history were reviewed and updated as appropriate: allergies, current medications, past family history, past medical history, past social history, past surgical history and problem list     Review of Systems  Pertinent items are noted in HPI  Objective      /72 (BP Location: Right arm, Patient Position: Sitting, Cuff Size: Large)   Ht 5' 7" (1 702 m)   Wt 88 7 kg (195 lb 9 6 oz)   LMP 11/26/2022 (Exact Date)   BMI 30 64 kg/m²     General:   alert and oriented, in no acute distress   Heart:  Breasts: regular rate and rhythm   appear normal, no suspicious masses, no skin or nipple changes or axillary nodes     Lungs: effort normal   Abdomen: soft, non-tender, without masses or organomegaly   Vulva: normal   Vagina: normal mucosa   Cervix: no lesions   Uterus: normal size, mobile, non-tender   Adnexa: normal adnexa and no mass, fullness, tenderness

## 2022-12-09 LAB
HPV HR 12 DNA CVX QL NAA+PROBE: NEGATIVE
HPV16 DNA CVX QL NAA+PROBE: NEGATIVE
HPV18 DNA CVX QL NAA+PROBE: NEGATIVE

## 2022-12-14 ENCOUNTER — TELEPHONE (OUTPATIENT)
Dept: OBGYN CLINIC | Facility: CLINIC | Age: 42
End: 2022-12-14

## 2022-12-14 LAB
LAB AP GYN PRIMARY INTERPRETATION: NORMAL
LAB AP LMP: NORMAL
Lab: NORMAL

## 2022-12-14 NOTE — TELEPHONE ENCOUNTER
----- Message from Art Lowry MD sent at 12/14/2022  2:14 PM EST -----  Notify Pap and HPV tests are negative

## 2022-12-14 NOTE — TELEPHONE ENCOUNTER
Per comm consent lm to pt with results and recommendations of pap from [address]   NEG

## 2023-01-15 DIAGNOSIS — F32.A ANXIETY AND DEPRESSION: ICD-10-CM

## 2023-01-15 DIAGNOSIS — F41.9 ANXIETY AND DEPRESSION: ICD-10-CM

## 2023-01-16 RX ORDER — ESCITALOPRAM OXALATE 10 MG/1
TABLET ORAL
Qty: 90 TABLET | Refills: 1 | Status: SHIPPED | OUTPATIENT
Start: 2023-01-16

## 2023-01-25 ENCOUNTER — RA CDI HCC (OUTPATIENT)
Dept: OTHER | Facility: HOSPITAL | Age: 43
End: 2023-01-25

## 2023-01-25 NOTE — PROGRESS NOTES
AsthmaNoted 12/10/2012   [[id number]]      F32A    NOT on the BPA- please assess using MEAT for 2023 billing    [facility] [address]  coding opportunities          Chart Reviewed number of suggestions sent to Provider: 2     Patients Insurance        Commercial Insurance: [address] [place] [address]

## 2023-02-01 ENCOUNTER — OFFICE VISIT (OUTPATIENT)
Dept: INTERNAL MEDICINE CLINIC | Facility: CLINIC | Age: 43
End: 2023-02-01

## 2023-02-01 VITALS
HEIGHT: 68 IN | OXYGEN SATURATION: 97 % | DIASTOLIC BLOOD PRESSURE: 78 MMHG | BODY MASS INDEX: 30.28 KG/M2 | HEART RATE: 73 BPM | SYSTOLIC BLOOD PRESSURE: 136 MMHG | WEIGHT: 199.8 LBS

## 2023-02-01 DIAGNOSIS — Z00.00 WELLNESS EXAMINATION: ICD-10-CM

## 2023-02-01 DIAGNOSIS — F32.A ANXIETY AND DEPRESSION: ICD-10-CM

## 2023-02-01 DIAGNOSIS — F41.9 ANXIETY AND DEPRESSION: ICD-10-CM

## 2023-02-01 DIAGNOSIS — R63.5 WEIGHT GAIN: Primary | ICD-10-CM

## 2023-02-01 DIAGNOSIS — E66.9 CLASS 1 OBESITY WITHOUT SERIOUS COMORBIDITY WITH BODY MASS INDEX (BMI) OF 30.0 TO 30.9 IN ADULT, UNSPECIFIED OBESITY TYPE: ICD-10-CM

## 2023-02-01 DIAGNOSIS — Z13.1 SCREENING FOR DIABETES MELLITUS: ICD-10-CM

## 2023-02-01 DIAGNOSIS — I47.1 PAROXYSMAL SVT (SUPRAVENTRICULAR TACHYCARDIA) (HCC): ICD-10-CM

## 2023-02-01 DIAGNOSIS — Z23 NEED FOR VACCINATION: ICD-10-CM

## 2023-02-01 DIAGNOSIS — Z13.6 SCREENING FOR CARDIOVASCULAR CONDITION: ICD-10-CM

## 2023-02-01 NOTE — PROGRESS NOTES
Josué    NAME: Joel Germain  AGE: 40 y o  SEX: female  : 1980     DATE: 2023     Assessment and Plan:     Problem List Items Addressed This Visit        Cardiovascular and Mediastinum    History Paroxysmal SVT (supraventricular tachycardia) (HCC)     Clinically stable and doing well continue the current medical regiment will continue monitor  Other    Anxiety and depression     Improved, wean off Lexapro slowly for recurrence of symptoms please return to usual dose and notify me         Weight gain - Primary     Mood is stable and doing well I have asked the patient to try to wean off the Lexapro which might be contributory to the weight gain we will also check third-generation TSH  Lexapro 5 mg once daily for 1 month then quarter tablet x1 month then discontinue if any recurrence of symptoms please return to the usual dose and notify me  I have counselled the pt to follow a healthy and balanced diet ,and recommend routine exercise  We will consult the weight management program         Relevant Orders    TSH, 3rd generation with Free T4 reflex    Cortisol Level, AM Specimen    Wellness examination     Assessment and plan 1  Health maintenance annual wellness examination overall the patient is clinically stable and doing well, we encouraged the patient to follow a healthy and balanced diet  We recommend that the patient exercise routinely approximately 30 minutes 5 times per week   We have reviewed the patient's vaccines and have made recommendations for updates if necessary        We will be ordering screening laboratories which are age appropriate  Return to the office in 6 months    call if any problems          Other Visit Diagnoses     Screening for cardiovascular condition        Relevant Orders    Comprehensive metabolic panel    Lipid Panel with Direct LDL reflex    Screening for diabetes mellitus        Relevant Orders    Hemoglobin A1C    Class 1 obesity without serious comorbidity with body mass index (BMI) of 30 0 to 30 9 in adult, unspecified obesity type        Relevant Orders    Ambulatory Referral to Weight Management    Need for vaccination        Relevant Orders    influenza vaccine, quadrivalent, recombinant, PF, 0 5 mL, for patients 18 yr+ (FLUBLOK) (Completed)      Return to office 6  months  call if any problems  Weight gain ,   Immunizations and preventive care screenings were discussed with patient today  Appropriate education was printed on patient's after visit summary  Counseling:  Exercise: the importance of regular exercise/physical activity was discussed  Recommend exercise 3-5 times per week for at least 30 minutes  Return in about 6 months (around 2023)  Chief Complaint:     No chief complaint on file  History of Present Illness:     Adult Annual Physical   Patient here for a comprehensive physical exam  The patient reports no problems  With going off lexapro on edge Diet and Physical Activity  Diet/Nutrition: well balanced diet  Exercise: vigorous cardiovascular exercise  Depression Screening  PHQ-2/9 Depression Screening    Little interest or pleasure in doing things: 0 - not at all  Feeling down, depressed, or hopeless: 0 - not at all  Trouble falling or staying asleep, or sleeping too much: 0 - not at all  Feeling tired or having little energy: 0 - not at all  Poor appetite or overeatin - not at all  Feeling bad about yourself - or that you are a failure or have let yourself or your family down: 0 - not at all  Trouble concentrating on things, such as reading the newspaper or watching television: 0 - not at all  Moving or speaking so slowly that other people could have noticed   Or the opposite - being so fidgety or restless that you have been moving around a lot more than usual: 0 - not at all  Thoughts that you would be better off dead, or of hurting yourself in some way: 0 - not at all  PHQ-9 Score: 0   PHQ-9 Interpretation: No or Minimal depression        General Health  Sleep: gets 4-6 hours of sleep on average  Hearing: normal - bilateral   Vision: no vision problems  Dental: regular dental visits  /GYN Health  Patient is: premenopausal  Last menstrual period: 1/25/23  Contraceptive method: oral contraceptives  Review of Systems:     Review of Systems   Constitutional: Negative for activity change, appetite change and unexpected weight change  HENT: Negative for congestion and postnasal drip  Eyes: Negative for visual disturbance  Respiratory: Negative for cough and shortness of breath  Cardiovascular: Negative for chest pain  Gastrointestinal: Negative for abdominal pain, diarrhea, nausea and vomiting  Neurological: Negative for dizziness, light-headedness and headaches  Hematological: Negative for adenopathy  Psychiatric/Behavioral: Negative for suicidal ideas  The patient is not nervous/anxious         Past Medical History:     Past Medical History:   Diagnosis Date   • Asthma     Last Assessed: 1/17/2018   • Blood type, Rh negative    • Kidney stones    • Miscarriage August 2007   • Palpitations    • Sciatica     Last Assessed: 12/10/2012   • Shortness of breath     Last Assessed: 10/23/2012   • SVT (supraventricular tachycardia) ([facility] [address] )    • Varicella 1990      Past Surgical History:     Past Surgical History:   Procedure Laterality Date   • ATRIAL ABLATION SURGERY      Supraventricular Tachycardia   • CHOLECYSTECTOMY      2006   • TOOTH EXTRACTION        Social History:     Social History     Socioeconomic History   • Marital status: None     Spouse name: None   • Number of children: None   • Years of education: None   • Highest education level: None   Occupational History   • Occupation:    Tobacco Use   • Smoking status: Never   • Smokeless tobacco: Never   Vaping Use   • Vaping Use: Never used   Substance and Sexual Activity   • Alcohol use:  Yes     Alcohol/week: 5 0 standard drinks     Types: 3 Cans of beer, 2 Standard drinks or equivalent per week     Comment: Social    • Drug use: No   • Sexual activity: Yes     Partners: Male     Birth control/protection: Pill     Comment: Pill   Other Topics Concern   • None   Social History Narrative    Always uses seat belt     Social Determinants of Health     Financial Resource Strain: Not on file   Food Insecurity: Not on file   Transportation Needs: Not on file   Physical Activity: Not on file   Stress: Not on file   Social Connections: Not on file   Intimate Partner Violence: Not on file   Housing Stability: Not on file      Family History:     Family History   Problem Relation Age of Onset   • Diabetes Mother    • Hypertension Mother    • Thyroid disease Mother         hypo   • Hypothyroidism Mother    • Diabetes Father    • Kidney cancer Father    • Graves' disease Father    • Hyperthyroidism Father    • Cancer Father         Renal carcinoma, kidney removed   • Heart attack Father         2019, survived   • Thyroid disease Father         hyper   • Prostate cancer Brother    • Skin cancer Maternal Grandfather    • Prostate cancer Maternal Grandfather    • Heart attack Paternal Grandfather    • Stroke Other         CVA   • No Known Problems Sister    • No Known Problems Daughter    • No Known Problems Maternal Grandmother    • No Known Problems Paternal Grandmother    • Colon cancer Cousin 40      Current Medications:     Current Outpatient Medications   Medication Sig Dispense Refill   • albuterol (PROVENTIL HFA,VENTOLIN HFA) 90 mcg/act inhaler Inhale 1-2 puffs     • Biotin 5000 MCG CAPS Take 1 capsule by mouth daily     • drospirenone-ethinyl estradiol (Vestura) 3-0 02 MG per tablet Take 1 tablet by mouth daily 84 tablet 3   • escitalopram (LEXAPRO) 10 mg tablet TAKE 1 TABLET BY MOUTH EVERY DAY 90 tablet 1   • Fexofenadine HCl (ALLEGRA PO) Take 1 tablet by mouth daily     • fluticasone (FLONASE) 50 mcg/act nasal spray 1 spray into each nostril daily       No current facility-administered medications for this visit  Allergies: Allergies   Allergen Reactions   • Cat Hair Extract    • Dust Mite Extract    • Molds & Smuts    • Penicillins Rash      Physical Exam:     /78   Pulse 73   Ht 5' 8" (1 727 m)   Wt 90 6 kg (199 lb 12 8 oz)   SpO2 97%   BMI 30 38 kg/m²     Physical Exam  Vitals and nursing note reviewed  Constitutional:       General: She is not in acute distress  Appearance: Normal appearance  She is well-developed  She is obese  She is not toxic-appearing or diaphoretic  HENT:      Head: Normocephalic and atraumatic  Right Ear: External ear normal       Left Ear: External ear normal       Nose: Nose normal    Eyes:      Pupils: Pupils are equal, round, and reactive to light  Cardiovascular:      Rate and Rhythm: Normal rate and regular rhythm  Heart sounds: Normal heart sounds  No murmur heard  Pulmonary:      Effort: Pulmonary effort is normal       Breath sounds: Normal breath sounds  Abdominal:      General: There is no distension  Palpations: Abdomen is soft  Tenderness: There is no abdominal tenderness  There is no guarding  Neurological:      Mental Status: She is alert  Psychiatric:         Mood and Affect: Mood is not anxious or depressed  Thought Content: Thought content does not include suicidal ideation            Fabio Powell DO  [facility]

## 2023-02-05 NOTE — ASSESSMENT & PLAN NOTE
Mood is stable and doing well I have asked the patient to try to wean off the Lexapro which might be contributory to the weight gain we will also check third-generation TSH  Lexapro 5 mg once daily for 1 month then quarter tablet x1 month then discontinue if any recurrence of symptoms please return to the usual dose and notify me  I have counselled the pt to follow a healthy and balanced diet ,and recommend routine exercise    We will consult the weight management program

## 2023-02-05 NOTE — ASSESSMENT & PLAN NOTE
Assessment and plan 1  Health maintenance annual wellness examination overall the patient is clinically stable and doing well, we encouraged the patient to follow a healthy and balanced diet  We recommend that the patient exercise routinely approximately 30 minutes 5 times per week   We have reviewed the patient's vaccines and have made recommendations for updates if necessary        We will be ordering screening laboratories which are age appropriate  Return to the office in 6 months    call if any problems

## 2023-02-05 NOTE — ASSESSMENT & PLAN NOTE
Improved, wean off Lexapro slowly for recurrence of symptoms please return to usual dose and notify me

## 2023-02-13 ENCOUNTER — OFFICE VISIT (OUTPATIENT)
Dept: BARIATRICS | Facility: CLINIC | Age: 43
End: 2023-02-13

## 2023-02-13 VITALS — WEIGHT: 198.4 LBS | HEIGHT: 68 IN | BODY MASS INDEX: 30.07 KG/M2

## 2023-02-13 DIAGNOSIS — E66.9 CLASS 1 OBESITY WITHOUT SERIOUS COMORBIDITY WITH BODY MASS INDEX (BMI) OF 30.0 TO 30.9 IN ADULT, UNSPECIFIED OBESITY TYPE: ICD-10-CM

## 2023-02-13 DIAGNOSIS — R63.5 ABNORMAL WEIGHT GAIN: ICD-10-CM

## 2023-02-13 NOTE — PROGRESS NOTES
Weight Management Medical Nutrition Assessment  Lina Antoine presented for a meal planning session  Today's weight is 198 4#  Lina Antoine is very motivated  She reports a history of weight loss in 2018  She reports weight gain since fall 2022  Per dietary recall patient consumes excess calories from unmeasured portions  We discussed portion control strategies, importance of meal consistency to aid in weight loss and meet hunger needs, and high protein snack ideas  She is physically active 5-7 days per week  Reviewed estimated needs on active days  Hydration adequate   We developed and reviewed a low calorie meal plan    Patient seen by Medical Provider in past 6 months:  no  Requested to schedule appointment with Medical Provider: No      Anthropometric Measurements  Start Weight (#): 198 4# (199# 2/1/23)  Current Weight (#): 198 4#  TBW % Change from start weight: n/a  Ideal Body Weight (#): 137 5#  Goal Weight (#): 165-170#  Highest:  Lowest:    Weight Loss History  Previous weight loss attempts: Commercial Programs ([employer], [employer]zie [employer], etc )  Exercise  Self Created Diets (Portion Control, Healthy Food Choices, etc )    Food and Nutrition Related History  Wake up: 6-6:30AM   Bed Time:    Food Recall  Breakfast: 10:30AM (starts with preworkout) 1/4 cup egg white and cheese quesadilla on carb balanced tortilla, coffee w/ 2 Tbsp almond milk creamer and splenda    Snack: skip OR banana  Lunch: 1-1:30PM burrito bowl (rice, black beans, corn, canned chicken, light sour cream, shredded cheese, hot sauce, salsa) and pretzels or cheese rickey  Snack: crackers, pretzels, candy (unmeasured)  Dinner: 7-7:30PM 5oz protein (chicken, pork, shrimp, etc ), 1 cup starch (pasta w/ cream sauce, potatoes, etc ), 1 cup vegetables (broccoli, green beans, corn) w/ butter  Snack: skip    Beverages: 8-10 cups water, coffee  Alcohol: 6 drinks/week   Volume of beverage intake: 70-80oz    Weekends: Same (dines out)  Cravings: salty  Trouble area of day: 3-5PM    Frequency of Eating out: 1-3x/week  Food restrictions: none  Cooking: self   Food Shopping: self    Physical Activity Intake  Activity: Cardio, weight lifting, HIIT, running  Frequency: 5-7x/week  Physical limitations/barriers to exercise: none    Estimated Needs  Energy  [employer] Energy Needs: BMR : 1,605   1-2# loss weekly sedentary: 699-8,713             1-2# loss weekly lightly active: 1,208-1,708  Maintenance calories for sedentary activity level: 1,927  Protein: 76-95gm     (1 2-1 5g/kg IBW)  Fluid: 75oz     (35mL/kg IBW)    Nutrition Diagnosis  Yes; Overweight/obesity  related to Excess energy intake as evidenced by  BMI more than normative standard for age and sex (obesity-grade I 26-30  9)       Nutrition Intervention    Nutrition Prescription  Calories: 1,300-1,700 (1700 calories on active days)  Protein: 76-95gm  Fluid: 75oz    Meal Plan (Laureano/Pro/Carb)  Breakfast: 250/20  Snack: 150-200/10-15  Lunch: 500-550/30-35  Snack: 100/5-10  Dinner: 400-500/30  Snack: skip    Nutrition Education:    Calorie controlled menu  Lean protein food choices  Healthy snack options  Food journaling tips      Nutrition Counseling:  Strategies: meal planning, portion sizes, healthy snack choices, hydration, fiber intake, protein intake, exercise, food journal      Monitoring and Evaluation:  Evaluation criteria:  Energy Intake  Meet protein needs  Maintain adequate hydration  Monitor weekly weight  Meal planning/preparation  Food journal   Decreased portions at mealtimes and snacks  Physical activity     Barriers to learning:none  Readiness to change: Preparation:  (Getting ready to change)   Comprehension: good  Expected Compliance: good

## 2023-03-16 ENCOUNTER — APPOINTMENT (OUTPATIENT)
Dept: LAB | Facility: HOSPITAL | Age: 43
End: 2023-03-16

## 2023-03-16 DIAGNOSIS — Z13.1 SCREENING FOR DIABETES MELLITUS: ICD-10-CM

## 2023-03-16 DIAGNOSIS — R63.5 WEIGHT GAIN: ICD-10-CM

## 2023-03-16 DIAGNOSIS — Z13.6 SCREENING FOR CARDIOVASCULAR CONDITION: ICD-10-CM

## 2023-03-16 LAB
ALBUMIN SERPL BCP-MCNC: 4 G/DL (ref 3.5–5)
ALP SERPL-CCNC: 40 U/L (ref 34–104)
ALT SERPL W P-5'-P-CCNC: 11 U/L (ref 7–52)
ANION GAP SERPL CALCULATED.3IONS-SCNC: 6 MMOL/L (ref 4–13)
AST SERPL W P-5'-P-CCNC: 15 U/L (ref 13–39)
BILIRUB SERPL-MCNC: 0.48 MG/DL (ref 0.2–1)
BUN SERPL-MCNC: 15 MG/DL (ref 5–25)
CALCIUM SERPL-MCNC: 8.7 MG/DL (ref 8.4–10.2)
CHLORIDE SERPL-SCNC: 105 MMOL/L (ref 96–108)
CHOLEST SERPL-MCNC: 146 MG/DL
CO2 SERPL-SCNC: 27 MMOL/L (ref 21–32)
CORTIS AM PEAK SERPL-MCNC: 31.6 UG/DL (ref 4.2–22.4)
CREAT SERPL-MCNC: 0.72 MG/DL (ref 0.6–1.3)
GFR SERPL CREATININE-BSD FRML MDRD: 104 ML/MIN/1.73SQ M
GLUCOSE P FAST SERPL-MCNC: 94 MG/DL (ref 65–99)
HDLC SERPL-MCNC: 63 MG/DL
LDLC SERPL CALC-MCNC: 52 MG/DL (ref 0–100)
POTASSIUM SERPL-SCNC: 4.1 MMOL/L (ref 3.5–5.3)
PROT SERPL-MCNC: 6.5 G/DL (ref 6.4–8.4)
SODIUM SERPL-SCNC: 138 MMOL/L (ref 135–147)
TRIGL SERPL-MCNC: 155 MG/DL
TSH SERPL DL<=0.05 MIU/L-ACNC: 3.98 UIU/ML (ref 0.45–4.5)

## 2023-03-17 LAB
EST. AVERAGE GLUCOSE BLD GHB EST-MCNC: 100 MG/DL
HBA1C MFR BLD: 5.1 %

## 2023-03-20 ENCOUNTER — OFFICE VISIT (OUTPATIENT)
Dept: INTERNAL MEDICINE CLINIC | Facility: CLINIC | Age: 43
End: 2023-03-20

## 2023-03-20 VITALS
SYSTOLIC BLOOD PRESSURE: 122 MMHG | DIASTOLIC BLOOD PRESSURE: 70 MMHG | WEIGHT: 196.8 LBS | BODY MASS INDEX: 29.83 KG/M2 | HEIGHT: 68 IN | RESPIRATION RATE: 16 BRPM | HEART RATE: 72 BPM | OXYGEN SATURATION: 97 %

## 2023-03-20 DIAGNOSIS — R79.89 ELEVATED MORNING SERUM CORTISOL LEVEL: Primary | ICD-10-CM

## 2023-03-20 RX ORDER — DOXYCYCLINE HYCLATE 100 MG/1
100 CAPSULE ORAL EVERY 12 HOURS
COMMUNITY
Start: 2023-03-03 | End: 2023-03-20

## 2023-03-20 NOTE — PROGRESS NOTES
Assessment/Plan:    Elevated morning serum cortisol level  · AM cortisol elevated at 31 6  · Reports weight gain with difficulty losing weight despite regular exercise and healthy eating   · Denies increased stress, easy bruising, muscle weakness, steroid use  · Check 24-hour urinary free cortisol excretion and urinary creatinine to evaluate for Cushing's  · Depending on results of this, will move forward with further evaluation  · Follow-up in 2 weeks         Problem List Items Addressed This Visit        Other    Elevated morning serum cortisol level - Primary     · AM cortisol elevated at 31 6  · Reports weight gain with difficulty losing weight despite regular exercise and healthy eating   · Denies increased stress, easy bruising, muscle weakness, steroid use  · Check 24-hour urinary free cortisol excretion and urinary creatinine to evaluate for Cushing's  · Depending on results of this, will move forward with further evaluation  · Follow-up in 2 weeks         Relevant Orders    Cortisol, Free, Urine, 24 Hour    Creatinine, urine, 24 hour         Subjective:      Patient ID: Leon Adams is a 40 y o  female  Patient is a 43-year-old female who presents for follow-up of lab results  She was last seen 2/1/23 with reports of weight gain  At that time, labs were ordered including TSH and A M  cortisol  AM cortisol was elevated at 31 6  Patient denies any increased stress, muscle weakness, or skin changes  She reports that she has had easy bruising since she was a child, no change recently  She also reports not having a regular menstrual period for the last 10 years  She weaned off her Lexapro completely 2 days ago  Endorses stable mood        The following portions of the patient's history were reviewed and updated as appropriate: allergies, current medications, past family history, past medical history, past social history, past surgical history and problem list     Review of Systems   Constitutional: Negative for appetite change and fatigue  Respiratory: Negative for shortness of breath  Cardiovascular: Negative for chest pain, palpitations and leg swelling  Endocrine: Negative for cold intolerance and heat intolerance  Musculoskeletal: Negative for myalgias  Skin: Negative for rash  Neurological: Negative for dizziness, weakness, light-headedness, numbness and headaches  Objective:    Return in about 2 weeks (around 4/3/2023) for Recheck  No results found        Allergies   Allergen Reactions   • Cat Hair Extract    • Dust Mite Extract    • Molds & Smuts    • Penicillins Rash       Past Medical History:   Diagnosis Date   • Asthma     Last Assessed: 1/17/2018   • Blood type, Rh negative    • Kidney stones    • Miscarriage August 2007   • Palpitations    • Sciatica     Last Assessed: 12/10/2012   • Shortness of breath     Last Assessed: 10/23/2012   • SVT (supraventricular tachycardia) ([facility] [address] )    • Varicella 1990     Past Surgical History:   Procedure Laterality Date   • ATRIAL ABLATION SURGERY      Supraventricular Tachycardia   • CHOLECYSTECTOMY      2006   • TOOTH EXTRACTION       Current Outpatient Medications on File Prior to Visit   Medication Sig Dispense Refill   • albuterol (PROVENTIL HFA,VENTOLIN HFA) 90 mcg/act inhaler Inhale 1-2 puffs     • Biotin 5000 MCG CAPS Take 1 capsule by mouth daily     • drospirenone-ethinyl estradiol (Vestura) 3-0 02 MG per tablet Take 1 tablet by mouth daily 84 tablet 3   • Fexofenadine HCl (ALLEGRA PO) Take 1 tablet by mouth daily     • fluticasone (FLONASE) 50 mcg/act nasal spray 1 spray into each nostril daily     • doxycycline hyclate (VIBRAMYCIN) 100 mg capsule Take 100 mg by mouth every 12 (twelve) hours (Patient not taking: Reported on 3/20/2023)     • escitalopram (LEXAPRO) 10 mg tablet TAKE 1 TABLET BY MOUTH EVERY DAY (Patient not taking: Reported on 3/20/2023) 90 tablet 1     No current facility-administered medications on file prior to visit  Family History   Problem Relation Age of Onset   • Diabetes Mother    • Hypertension Mother    • Thyroid disease Mother         hypo   • Hypothyroidism Mother    • Diabetes Father    • Kidney cancer Father    • Graves' disease Father    • Hyperthyroidism Father    • Cancer Father         Renal carcinoma, kidney removed   • Heart attack Father         2019, survived   • Thyroid disease Father         hyper   • Prostate cancer Brother    • Skin cancer Maternal Grandfather    • Prostate cancer Maternal Grandfather    • Heart attack Paternal Grandfather    • Stroke Other         CVA   • No Known Problems Sister    • No Known Problems Daughter    • No Known Problems Maternal Grandmother    • No Known Problems Paternal Grandmother    • Colon cancer Cousin 36     Social History     Socioeconomic History   • Marital status: /Civil Union     Spouse name: Not on file   • Number of children: Not on file   • Years of education: Not on file   • Highest education level: Not on file   Occupational History   • Occupation:    Tobacco Use   • Smoking status: Never   • Smokeless tobacco: Never   Vaping Use   • Vaping Use: Never used   Substance and Sexual Activity   • Alcohol use:  Yes     Alcohol/week: 5 0 standard drinks     Types: 3 Cans of beer, 2 Standard drinks or equivalent per week     Comment: Social    • Drug use: No   • Sexual activity: Yes     Partners: Male     Birth control/protection: Pill     Comment: Pill   Other Topics Concern   • Not on file   Social History Narrative    Always uses seat belt     Social Determinants of Health     Financial Resource Strain: Not on file   Food Insecurity: Not on file   Transportation Needs: Not on file   Physical Activity: Not on file   Stress: Not on file   Social Connections: Not on file   Intimate Partner Violence: Not on file   Housing Stability: Not on file     Vitals:    03/20/23 1339   BP: 122/70   Pulse: 72   Resp: 16   SpO2: 97%   Weight: 89 3 kg (196 lb 12 8 oz)   Height: 5' 7 5" (1 715 m)     Results for orders placed or performed in visit on 03/16/23   Comprehensive metabolic panel   Result Value Ref Range    Sodium 138 135 - 147 mmol/L    Potassium 4 1 3 5 - 5 3 mmol/L    Chloride 105 96 - 108 mmol/L    CO2 27 21 - 32 mmol/L    ANION GAP 6 4 - 13 mmol/L    BUN 15 5 - 25 mg/dL    Creatinine 0 72 0 60 - 1 30 mg/dL    Glucose, Fasting 94 65 - 99 mg/dL    Calcium 8 7 8 4 - 10 2 mg/dL    AST 15 13 - 39 U/L    ALT 11 7 - 52 U/L    Alkaline Phosphatase 40 34 - 104 U/L    Total Protein 6 5 6 4 - 8 4 g/dL    Albumin 4 0 3 5 - 5 0 g/dL    Total Bilirubin 0 48 0 20 - 1 00 mg/dL    eGFR 104 ml/min/1 73sq m   TSH, 3rd generation with Free T4 reflex   Result Value Ref Range    TSH 3RD GENERATON 3 984 0 450 - 4 500 uIU/mL   Cortisol Level, AM Specimen   Result Value Ref Range    Cortisol - AM 31 6 (H) 4 2 - 22 4 ug/dL   Hemoglobin A1C   Result Value Ref Range    Hemoglobin A1C 5 1 Normal 3 8-5 6%; PreDiabetic 5 7-6 4%; Diabetic >=6 5%; Glycemic control for adults with diabetes <7 0% %     mg/dl   Lipid Panel with Direct LDL reflex   Result Value Ref Range    Cholesterol 146 See Comment mg/dL    Triglycerides 155 (H) See Comment mg/dL    HDL, Direct 63 >=50 mg/dL    LDL Calculated 52 0 - 100 mg/dL     Weight (last 2 days)     Date/Time Weight    03/20/23 1339 89 3 (196 8)        Body mass index is 30 37 kg/m²  BP      Temp      Pulse     Resp      SpO2        Vitals:    03/20/23 1339   Weight: 89 3 kg (196 lb 12 8 oz)     Vitals:    03/20/23 1339   Weight: 89 3 kg (196 lb 12 8 oz)       /70   Pulse 72   Resp 16   Ht 5' 7 5" (1 715 m)   Wt 89 3 kg (196 lb 12 8 oz)   SpO2 97%   BMI 30 37 kg/m²          Physical Exam  Vitals reviewed  Constitutional:       General: She is not in acute distress  Appearance: She is not ill-appearing  HENT:      Head: Normocephalic and atraumatic        Mouth/Throat:      Mouth: Mucous membranes are moist  Pharynx: Oropharynx is clear  Eyes:      Extraocular Movements: Extraocular movements intact  Cardiovascular:      Rate and Rhythm: Normal rate and regular rhythm  Pulses: Normal pulses  Pulmonary:      Effort: Pulmonary effort is normal  No respiratory distress  Breath sounds: Normal breath sounds  Musculoskeletal:         General: No swelling or tenderness  Normal range of motion  Cervical back: Normal range of motion and neck supple  Skin:     General: Skin is warm and dry  Findings: No rash  Neurological:      General: No focal deficit present  Mental Status: She is alert and oriented to person, place, and time  Motor: No weakness  Psychiatric:         Mood and Affect: Mood normal          Behavior: Behavior normal          Thought Content:  Thought content normal          Judgment: Judgment normal

## 2023-03-20 NOTE — ASSESSMENT & PLAN NOTE
· AM cortisol elevated at 31 6  · Reports weight gain with difficulty losing weight despite regular exercise and healthy eating   · Denies increased stress, easy bruising, muscle weakness, steroid use  · Check 24-hour urinary free cortisol excretion and urinary creatinine to evaluate for Cushing's  · Depending on results of this, will move forward with further evaluation  · Follow-up in 2 weeks

## 2023-04-16 PROBLEM — E66.09 CLASS 1 OBESITY DUE TO EXCESS CALORIES WITHOUT SERIOUS COMORBIDITY IN ADULT: Status: ACTIVE | Noted: 2023-04-16

## 2023-04-16 PROBLEM — E66.811 CLASS 1 OBESITY DUE TO EXCESS CALORIES WITHOUT SERIOUS COMORBIDITY IN ADULT: Status: ACTIVE | Noted: 2023-04-16

## 2023-05-08 ENCOUNTER — HOSPITAL ENCOUNTER (OUTPATIENT)
Dept: MAMMOGRAPHY | Facility: IMAGING CENTER | Age: 43
Discharge: HOME | End: 2023-05-08

## 2023-05-08 VITALS — WEIGHT: 195 LBS | BODY MASS INDEX: 30.61 KG/M2 | HEIGHT: 67 IN

## 2023-05-08 DIAGNOSIS — Z12.31 ENCOUNTER FOR SCREENING MAMMOGRAM FOR MALIGNANT NEOPLASM OF BREAST: ICD-10-CM

## 2023-05-09 ENCOUNTER — LAB (OUTPATIENT)
Dept: LAB | Facility: HOSPITAL | Age: 43
End: 2023-05-09

## 2023-05-09 DIAGNOSIS — R79.89 ELEVATED CORTISOL LEVEL: ICD-10-CM

## 2023-05-09 LAB
CORTIS AM PEAK SERPL-MCNC: 25 UG/DL (ref 4.2–22.4)
CREAT 24H UR-MRATE: 1.8 G/24HR (ref 0.6–1.8)
SPECIMEN VOL UR: 2350 ML

## 2023-05-12 LAB
CORTIS F 24H UR-MRATE: 39 UG/24 HR (ref 6–42)
CORTIS F UR-MCNC: 17 UG/L

## 2023-08-02 ENCOUNTER — OFFICE VISIT (OUTPATIENT)
Dept: INTERNAL MEDICINE CLINIC | Facility: CLINIC | Age: 43
End: 2023-08-02
Payer: COMMERCIAL

## 2023-08-02 VITALS
OXYGEN SATURATION: 99 % | DIASTOLIC BLOOD PRESSURE: 82 MMHG | HEART RATE: 66 BPM | SYSTOLIC BLOOD PRESSURE: 124 MMHG | BODY MASS INDEX: 30.7 KG/M2 | WEIGHT: 196 LBS

## 2023-08-02 DIAGNOSIS — E66.09 CLASS 1 OBESITY DUE TO EXCESS CALORIES WITHOUT SERIOUS COMORBIDITY WITH BODY MASS INDEX (BMI) OF 30.0 TO 30.9 IN ADULT: ICD-10-CM

## 2023-08-02 DIAGNOSIS — Z11.59 NEED FOR HEPATITIS C SCREENING TEST: Primary | ICD-10-CM

## 2023-08-02 PROCEDURE — 99214 OFFICE O/P EST MOD 30 MIN: CPT | Performed by: INTERNAL MEDICINE

## 2023-08-02 NOTE — PROGRESS NOTES
Assessment/Plan:    Class 1 obesity due to excess calories without serious comorbidity in adult  I have counselled the pt to follow a healthy and balanced diet ,and recommend routine exercise. Urine 24-hour collection for cortisol is normal at this point time we discussed treatment options we will hold off on Endo consultation but will have the patient meet with pharmacist Ajay for consideration of Wegovy or other medical options. I would like the patient to continue a healthy and balanced diet portion control and routine exercise for which she is trying her best.         Problem List Items Addressed This Visit        Other    Class 1 obesity due to excess calories without serious comorbidity in adult     I have counselled the pt to follow a healthy and balanced diet ,and recommend routine exercise. Urine 24-hour collection for cortisol is normal at this point time we discussed treatment options we will hold off on Endo consultation but will have the patient meet with pharmacist Ajay for consideration of Wegovy or other medical options. I would like the patient to continue a healthy and balanced diet portion control and routine exercise for which she is trying her best.         Relevant Orders    Ambulatory referral to clinical pharmacy   Other Visit Diagnoses     Need for hepatitis C screening test    -  Primary          RTO in 4 to 6 months of visit 25 minutes counseling was provided regarding the screening test patient is agreeable greater than 50% diet, exercise, medical treatment options review of laboratories  Subjective:      Patient ID: Paluina Camejo is a 44 y.o. female. HPI 38-year old female coming in for a follow up office visit regarding obesity review laboratories; the patient reports me compliant taking medications without untoward side effects the.   The patient is here to review his medical condition, update me on the medical condition and the patient reports me no hospitalizations and no ER visits. No injuries no illnesses overall doing very well she is having difficulty losing weight she is trying to follow a healthy and balanced diet she is tracking her calories she is exercising moderately and despite all that she is still not losing weight she is interested in a medication at this point. Not losing wt , exercizing , diety  The following portions of the patient's history were reviewed and updated as appropriate: allergies, current medications, past family history, past medical history, past social history, past surgical history and problem list.    Review of Systems   Constitutional: Negative for activity change, appetite change and unexpected weight change. HENT: Negative for congestion. Eyes: Negative for visual disturbance. Respiratory: Negative for cough and shortness of breath. Cardiovascular: Negative for chest pain. Gastrointestinal: Negative for abdominal pain, diarrhea, nausea and vomiting. Neurological: Negative for dizziness, light-headedness and headaches. Objective:    Return in about 4 months (around 12/2/2023). No results found.       Allergies   Allergen Reactions   • Cat Hair Extract    • Dust Mite Extract    • Molds & Smuts    • Penicillins Rash       Past Medical History:   Diagnosis Date   • Asthma     Last Assessed: 1/17/2018   • Blood type, Rh negative    • Kidney stones    • Miscarriage August 2007   • Palpitations    • Sciatica     Last Assessed: 12/10/2012   • Shortness of breath     Last Assessed: 10/23/2012   • SVT (supraventricular tachycardia) ([address])    • Varicella 1990     Past Surgical History:   Procedure Laterality Date   • ATRIAL ABLATION SURGERY      Supraventricular Tachycardia   • CHOLECYSTECTOMY      2006   • TOOTH EXTRACTION       Current Outpatient Medications on File Prior to Visit   Medication Sig Dispense Refill   • albuterol (PROVENTIL HFA,VENTOLIN HFA) 90 mcg/act inhaler Inhale 1-2 puffs     • Biotin 5000 MCG CAPS Take 1 capsule by mouth daily     • drospirenone-ethinyl estradiol (Vestura) 3-0.02 MG per tablet Take 1 tablet by mouth daily 84 tablet 3   • Fexofenadine HCl (ALLEGRA PO) Take 1 tablet by mouth daily     • fluticasone (FLONASE) 50 mcg/act nasal spray 1 spray into each nostril daily       No current facility-administered medications on file prior to visit. Family History   Problem Relation Age of Onset   • Diabetes Mother    • Hypertension Mother    • Thyroid disease Mother         hypo   • Hypothyroidism Mother    • Diabetes Father    • Kidney cancer Father    • Graves' disease Father    • Hyperthyroidism Father    • Cancer Father         Renal carcinoma, kidney removed   • Heart attack Father         2019, survived   • Thyroid disease Father         hyper   • Prostate cancer Brother    • Skin cancer Maternal Grandfather    • Prostate cancer Maternal Grandfather    • Heart attack Paternal Grandfather    • Stroke Other         CVA   • No Known Problems Sister    • No Known Problems Daughter    • No Known Problems Maternal Grandmother    • No Known Problems Paternal Grandmother    • Colon cancer Cousin 36     Social History     Socioeconomic History   • Marital status: /Civil Union     Spouse name: Not on file   • Number of children: Not on file   • Years of education: Not on file   • Highest education level: Not on file   Occupational History   • Occupation:    Tobacco Use   • Smoking status: Never   • Smokeless tobacco: Never   Vaping Use   • Vaping Use: Never used   Substance and Sexual Activity   • Alcohol use:  Yes     Alcohol/week: 5.0 standard drinks of alcohol     Types: 3 Cans of beer, 2 Standard drinks or equivalent per week     Comment: Social    • Drug use: No   • Sexual activity: Yes     Partners: Male     Birth control/protection: Pill     Comment: Pill   Other Topics Concern   • Not on file   Social History Narrative    Always uses seat belt     Social Determinants of Health Financial Resource Strain: Not on file   Food Insecurity: Not on file   Transportation Needs: Not on file   Physical Activity: Not on file   Stress: Not on file   Social Connections: Not on file   Intimate Partner Violence: Not on file   Housing Stability: Not on file     Vitals:    08/02/23 1519   BP: 124/82   Pulse: 66   SpO2: 99%   Weight: 88.9 kg (196 lb)     Results for orders placed or performed in visit on 05/09/23   Cortisol, Free, Urine, 24 Hour   Result Value Ref Range    Cortisol,F,ug/L,U 17 Undefined ug/L    Cortisol, 24H Ur 39 6 - 42 ug/24 hr   Creatinine, urine, 24 hour   Result Value Ref Range    Creatinine, 24H Ur 1.8 0.6 - 1.8 g/24Hr    TOTAL URINE VOLUME 2,350 ml   Cortisol Level, AM Specimen   Result Value Ref Range    Cortisol - AM 25.0 (H) 4.2 - 22.4 ug/dL     Weight (last 2 days)     None        Body mass index is 30.7 kg/m². BP      Temp      Pulse     Resp      SpO2        Vitals:    08/02/23 1519   Weight: 88.9 kg (196 lb)     Vitals:    08/02/23 1519   Weight: 88.9 kg (196 lb)       /82   Pulse 66   Wt 88.9 kg (196 lb)   SpO2 99%   BMI 30.70 kg/m²          Physical Exam  Vitals and nursing note reviewed. Constitutional:       Appearance: She is well-developed. HENT:      Head: Normocephalic. Eyes:      General: No scleral icterus. Right eye: No discharge. Left eye: No discharge. Conjunctiva/sclera: Conjunctivae normal.      Pupils: Pupils are equal, round, and reactive to light. Cardiovascular:      Rate and Rhythm: Normal rate and regular rhythm. Heart sounds: Normal heart sounds. No murmur heard. No friction rub. No gallop. Pulmonary:      Effort: No respiratory distress. Breath sounds: Normal breath sounds. No wheezing or rales. Abdominal:      General: Bowel sounds are normal. There is no distension. Palpations: Abdomen is soft. There is no mass. Tenderness: There is no abdominal tenderness.  There is no guarding or rebound. Musculoskeletal:         General: No deformity. Cervical back: Neck supple. Lymphadenopathy:      Cervical: No cervical adenopathy. Neurological:      Mental Status: She is alert.       Coordination: Coordination normal.

## 2023-08-06 NOTE — ASSESSMENT & PLAN NOTE
I have counselled the pt to follow a healthy and balanced diet ,and recommend routine exercise. Urine 24-hour collection for cortisol is normal at this point time we discussed treatment options we will hold off on Endo consultation but will have the patient meet with pharmacist Hiliary for consideration of Wegovy or other medical options.   I would like the patient to continue a healthy and balanced diet portion control and routine exercise for which she is trying her best.

## 2023-08-10 ENCOUNTER — CLINICAL SUPPORT (OUTPATIENT)
Dept: INTERNAL MEDICINE CLINIC | Facility: CLINIC | Age: 43
End: 2023-08-10

## 2023-08-10 VITALS — WEIGHT: 196 LBS | BODY MASS INDEX: 30.7 KG/M2

## 2023-08-10 DIAGNOSIS — E66.09 CLASS 1 OBESITY DUE TO EXCESS CALORIES WITHOUT SERIOUS COMORBIDITY WITH BODY MASS INDEX (BMI) OF 30.0 TO 30.9 IN ADULT: ICD-10-CM

## 2023-08-10 NOTE — PROGRESS NOTES
[address], PharmD, BCACP      ASSESSMENT/PLAN                                                                                     1. Class 1 obesity due to excess calories without serious comorbidity with body mass index (BMI) of 30.0 to 30.9 in adult  Assessment & Plan:  Comorbid Weight Related Risk Factors: sleep apnea/hypopnea  Current BMI: Estimated body mass index is 30.7 kg/m² as calculated from the following:    Height as of 5/8/23: 5' 7" (1.702 m). Weight as of 8/2/23: 88.9 kg (196 lb). Qsymia contraindicated due to kidney stone history   Medications not covered by insurance    Medications: reviewed Contrave - either using medications in combination Contrave formulation or separately. Patient would like to think about medication's and will see if insurance formulary changes  Lifestyle: reviewed plant based diet, patient will look into    Orders:  -     Ambulatory referral to clinical pharmacy      Follow-Up: with PCP as scheduled      SUBJECTIVE                                                                                                              Medication Adherence: Medication list reviewed with patient, reports the following discrepancies/problems:  albuterol  ALLEGRA PO  Biotin Caps  drospirenone-ethinyl estradiol  Fluticasone    Contacted insurance, TJRV not covered at this time as weight loss medications are not covered    Gallstone History: No - gall bladder removed > 10 years ago    Kidney Stone History: Yes, describe: 6-7 years ago, unknown why developed and no longer following with urology    2. Lifestyle: 1500 calories per day; following with dietician through [facility]. Using Lose it miquel. In 9/2023 was     Exercise: participating in program called 80 day obsession through [facility], currently in day 53; will run 1-2 miles per week as well.       OBJECTIVE Wt Readings from Last 5 Encounters:   08/10/23 88.9 kg (196 lb)   08/02/23 88.9 kg (196 lb)   05/08/23 88.5 kg (195 lb)   04/14/23 89.3 kg (196 lb 12.8 oz)   03/20/23 89.3 kg (196 lb 12.8 oz)       BP Readings from Last 3 Encounters:   08/02/23 124/82   04/14/23 128/70   03/20/23 122/70       Pulse Readings from Last 3 Encounters:   08/02/23 66   04/14/23 73   03/20/23 72       Pertinent Lab Data:  Lab Results   Component Value Date     03/10/2015    SODIUM 138 03/16/2023    K 4.1 03/16/2023     03/16/2023    CO2 27 03/16/2023    ANIONGAP 8 03/10/2015    AGAP 6 03/16/2023    BUN 15 03/16/2023    CREATININE 0.72 03/16/2023    GLUC 98 05/12/2018    GLUF 94 03/16/2023    CALCIUM 8.7 03/16/2023    AST 15 03/16/2023    ALT 11 03/16/2023    ALKPHOS 40 03/16/2023    PROT 7.9 03/10/2015    TP 6.5 03/16/2023    BILITOT 0.49 03/10/2015    TBILI 0.48 03/16/2023    EGFR 104 03/16/2023       Lab Results   Component Value Date    HGBA1C 5.1 03/16/2023       Lab Results   Component Value Date    FUL6BTDSSHNU 3.984 03/16/2023         Pharmacist Tracking Tool  Reason For Outreach: Embedded Pharmacist  Demographics:  Intervention Method:  In Person  Type of Intervention: New  Topics Addressed: Obesity  Pharmacologic Interventions: Medication Initiation  Non-Pharmacologic Interventions: Disease state education, Home Monitoring and Care Gap  Time:  Direct Patient Care: 20 mins   Care Coordination: 10 mins  Recommendation Recipient: Patient/Caregiver  Outcome: Accepted

## 2023-08-10 NOTE — ASSESSMENT & PLAN NOTE
Comorbid Weight Related Risk Factors: sleep apnea/hypopnea  Current BMI: Estimated body mass index is 30.7 kg/m² as calculated from the following:    Height as of 5/8/23: 5' 7" (1.702 m). Weight as of 8/2/23: 88.9 kg (196 lb). Qsymia contraindicated due to kidney stone history   Medications not covered by insurance    • Medications: reviewed Contrave - either using medications in combination Contrave formulation or separately.  Patient would like to think about medication's and will see if insurance formulary changes  • Lifestyle: reviewed plant based diet, patient will look into

## 2023-10-12 ENCOUNTER — TELEMEDICINE (OUTPATIENT)
Dept: INTERNAL MEDICINE CLINIC | Facility: CLINIC | Age: 43
End: 2023-10-12
Payer: COMMERCIAL

## 2023-10-12 DIAGNOSIS — J01.00 ACUTE NON-RECURRENT MAXILLARY SINUSITIS: Primary | ICD-10-CM

## 2023-10-12 PROCEDURE — 99213 OFFICE O/P EST LOW 20 MIN: CPT | Performed by: INTERNAL MEDICINE

## 2023-10-12 RX ORDER — DOXYCYCLINE 100 MG/1
100 TABLET ORAL 2 TIMES DAILY
Qty: 20 TABLET | Refills: 0 | Status: SHIPPED | OUTPATIENT
Start: 2023-10-12 | End: 2023-10-22

## 2023-10-12 NOTE — PATIENT INSTRUCTIONS
-A prescription for doxycycline has been sent to your pharmacy.  -Prior to flying use Afrin nasal spray 30 minutes prior to boarding plane. Do not use Afrin for more than 3 consecutive days as longer use can lead to rebound congestion.  -Take over-the-counter Mucinex as needed.

## 2023-10-12 NOTE — PROGRESS NOTES
Virtual Regular Visit    Verification of patient location:    Patient is located at Home in the following state in which I hold an active license [place]      Assessment/Plan:    Problem List Items Addressed This Visit    None  Visit Diagnoses     Acute non-recurrent maxillary sinusitis    -  Primary    Relevant Medications    doxycycline (ADOXA) 100 MG tablet      -Recommended patient use Afrin at least 30 minutes prior to boarding her flight. She should not use this for more than 3 consecutive days to avoid rebound congestion.  -Should her symptoms persist she has been given a prescription for doxycycline that she can fill to take since she will be outside of the area on vacation.  -She may use Mucinex as needed for congestion. Reason for visit is   Chief Complaint   Patient presents with   • Virtual Regular Visit          Encounter provider Izaiah Gomes MD    Provider located at [facility] [id number]      Recent Visits  No visits were found meeting these conditions. Showing recent visits within past 7 days and meeting all other requirements  Today's Visits  Date Type Provider Dept   10/12/23 Telemedicine Izaiah Gomes, [address] [place] [address] today's visits and meeting all other requirements  Future Appointments  No visits were found meeting these conditions. Showing future appointments within next 150 days and meeting all other requirements       The patient was identified by name and date of birth. Heidi Pelaez was informed that this is a telemedicine visit and that the visit is being conducted through the [address] [place] [address] Memonic platform. She agrees to proceed. .  My office door was closed. No one else was in the room. She acknowledged consent and understanding of privacy and security of the video platform. The patient has agreed to participate and understands they can discontinue the visit at any time.     Patient is aware this is a billable service. Subjective  Rafat Dunlap is a 44 y.o. female who presents today complaining of 2 to 3 days of sinus pain/pressure. She endorses a history of "sinus issues."  She complains of pressure behind her eyes, sore teeth and postnasal drip. She denies any fevers or chills. She reports she will be flying in a few days and at the changes in elevation while flying always significantly affect her sinuses. HPI     Past Medical History:   Diagnosis Date   • Asthma     Last Assessed: 1/17/2018   • Blood type, Rh negative    • Kidney stones    • Miscarriage August 2007   • Palpitations    • Sciatica     Last Assessed: 12/10/2012   • Shortness of breath     Last Assessed: 10/23/2012   • SVT (supraventricular tachycardia) ([address])    • Varicella 1990       Past Surgical History:   Procedure Laterality Date   • ATRIAL ABLATION SURGERY      Supraventricular Tachycardia   • CHOLECYSTECTOMY      2006   • TOOTH EXTRACTION         Current Outpatient Medications   Medication Sig Dispense Refill   • doxycycline (ADOXA) 100 MG tablet Take 1 tablet (100 mg total) by mouth 2 (two) times a day for 10 days 20 tablet 0   • albuterol (PROVENTIL HFA,VENTOLIN HFA) 90 mcg/act inhaler Inhale 1-2 puffs     • Biotin 5000 MCG CAPS Take 1 capsule by mouth daily     • drospirenone-ethinyl estradiol (Vestura) 3-0.02 MG per tablet Take 1 tablet by mouth daily 84 tablet 3   • Fexofenadine HCl (ALLEGRA PO) Take 1 tablet by mouth daily     • fluticasone (FLONASE) 50 mcg/act nasal spray 1 spray into each nostril daily       No current facility-administered medications for this visit. Allergies   Allergen Reactions   • Cat Hair Extract    • Dust Mite Extract    • Molds & Smuts    • Penicillins Rash       Review of Systems  All other systems negative except for pertinent findings noted in HPI. Video Exam    There were no vitals filed for this visit.     Physical Exam   General: No acute distress  HEENT: NCAT  Pulmonary: No respiratory distress  Psychiatric: Normal mood and affect     Visit Time  Total Visit Duration: 5 minutes

## 2023-11-26 DIAGNOSIS — Z30.41 ENCOUNTER FOR SURVEILLANCE OF CONTRACEPTIVE PILLS: ICD-10-CM

## 2023-11-27 RX ORDER — DROSPIRENONE AND ETHINYL ESTRADIOL 0.02-3(28)
1 KIT ORAL DAILY
Qty: 28 TABLET | Refills: 0 | Status: SHIPPED | OUTPATIENT
Start: 2023-11-27

## 2023-11-30 ENCOUNTER — OFFICE VISIT (OUTPATIENT)
Dept: INTERNAL MEDICINE CLINIC | Facility: CLINIC | Age: 43
End: 2023-11-30
Payer: COMMERCIAL

## 2023-11-30 VITALS
WEIGHT: 194.2 LBS | TEMPERATURE: 98.6 F | DIASTOLIC BLOOD PRESSURE: 74 MMHG | HEIGHT: 69 IN | BODY MASS INDEX: 28.76 KG/M2 | OXYGEN SATURATION: 99 % | SYSTOLIC BLOOD PRESSURE: 130 MMHG | HEART RATE: 76 BPM

## 2023-11-30 DIAGNOSIS — M54.42 ACUTE LEFT-SIDED LOW BACK PAIN WITH LEFT-SIDED SCIATICA: Primary | ICD-10-CM

## 2023-11-30 PROCEDURE — 99213 OFFICE O/P EST LOW 20 MIN: CPT | Performed by: INTERNAL MEDICINE

## 2023-11-30 RX ORDER — GABAPENTIN 300 MG/1
CAPSULE ORAL
Qty: 60 CAPSULE | Refills: 0 | Status: SHIPPED | OUTPATIENT
Start: 2023-11-30

## 2023-11-30 NOTE — PROGRESS NOTES
Name: Jack Workman      : 1980      MRN: [id number]  Encounter Provider: Tanner Raines MD  Encounter Date: 2023   Encounter department: [facility]     1. Acute left-sided low back pain with left-sided sciatica  -     gabapentin (Neurontin) 300 mg capsule; Start with 1 capsule at night. After 4-5 days increase to 2 capsules at night. -     Ambulatory Referral to Physical Therapy; Future    -A referral has naina made for PT  -Will start Gabapentin 300 mg nightly for sciatica with instructions to increase to 600 mg nightly after 4 to 5 days  -If there is no improvement with PT an MRI will be ordered for further evaluation       Subjective      HPI  Patient presents as an acute visit. She complains of left-sided low back pain that radiates around her left hip and into her left groin. She states the pain shoots is far down as her left knee. Prior to the start of her pain she notes she was working out in [employer] as she typically does performing lunges and RDL's. She endorses occasional paresthesias but denies any lower extremity weakness. She was recently seen in urgent care 2 weeks ago for evaluation of the same symptoms. She was given a Medrol dose pack which provided some mild relief. All other systems negative except for pertinent findings noted in HPI.        Current Outpatient Medications on File Prior to Visit   Medication Sig   • albuterol (PROVENTIL HFA,VENTOLIN HFA) 90 mcg/act inhaler Inhale 1-2 puffs   • Biotin 5000 MCG CAPS Take 1 capsule by mouth daily   • drospirenone-ethinyl estradiol (Vestura) 3-0.02 MG per tablet TAKE 1 TABLET BY MOUTH EVERY DAY   • Fexofenadine HCl (ALLEGRA PO) Take 1 tablet by mouth daily   • fluticasone (FLONASE) 50 mcg/act nasal spray 1 spray into each nostril daily       Objective     /74 (BP Location: Left arm, Patient Position: Sitting, Cuff Size: Large)   Pulse 76   Temp 98.6 °F (37 °C) (Probe) Ht 5' 8.9" (1.75 m)   Wt 88.1 kg (194 lb 3.2 oz)   SpO2 99%   BMI 28.76 kg/m²     BP Readings from Last 3 Encounters:   11/30/23 130/74   08/02/23 124/82   04/14/23 128/70        Wt Readings from Last 3 Encounters:   11/30/23 88.1 kg (194 lb 3.2 oz)   08/10/23 88.9 kg (196 lb)   08/02/23 88.9 kg (196 lb)       Physical Exam    General: NAD  HEENT: NCAT, EOMI, normal conjunctiva  Cardiovascular: RRR, normal S1 and S2, no m/r/g  Pulmonary: Normal respiratory effort, no wheezes, rales or rhonchi  MSK: Normal bulk and tone, 5 out of 5 strength in lower extremities   Neuro: Sensation to light touch grossly intact, 2+ patellar and Achilles DTRs bilaterally  Extremities: No lower extremity edema  Skin: Normal skin color, no rashes     Izaiah Pedroza MD

## 2023-12-06 ENCOUNTER — EVALUATION (OUTPATIENT)
Dept: PHYSICAL THERAPY | Facility: CLINIC | Age: 43
End: 2023-12-06
Payer: COMMERCIAL

## 2023-12-06 DIAGNOSIS — M54.42 ACUTE LEFT-SIDED LOW BACK PAIN WITH LEFT-SIDED SCIATICA: Primary | ICD-10-CM

## 2023-12-06 PROCEDURE — 97110 THERAPEUTIC EXERCISES: CPT | Performed by: PHYSICAL THERAPIST

## 2023-12-06 PROCEDURE — 97161 PT EVAL LOW COMPLEX 20 MIN: CPT | Performed by: PHYSICAL THERAPIST

## 2023-12-06 NOTE — PROGRESS NOTES
PT Evaluation     Today's date: 2023  Patient name: Yancy Harris  : 1980  MRN: [id number]  Referring provider: Evelia Curry  Dx:   Encounter Diagnosis     ICD-10-CM    1. Acute left-sided low back pain with left-sided sciatica  M54.42 Ambulatory Referral to Physical Therapy                     Assessment  Assessment details: Yancy Harris is a 44 y.o. female who presents with acute L lumbar radiculopathy. Pt has discomfort with lumbar ROM and symptoms/testing consistent with SI dysfunction. Pt currently has interrupted sleep 4-5x/night, increased pain with prolonged sitting, modified recreation. Pt would benefit from skilled PT to address the above impairments and to return to pain free function. Impairments: abnormal or restricted ROM, lacks appropriate home exercise program and pain with function  Functional limitations: interrupted sleep 4-5x/night, increased pain with prolonged sitting, modified recreation  Symptom irritability: moderateUnderstanding of Dx/Px/POC: good   Prognosis: good    Goals  1. Pt will be independent with HEP upon DC. 2. Pt's lumbar ROM will be WNL and pain free. 3. Pt's pain will be no more than 2/10 to allow for uninterrupted sleep. 4. Pt will be able to self correct innominate rotation to allow for return to PLOF. Plan  Patient would benefit from: skilled physical therapy  Planned modality interventions: low level laser therapy  Planned therapy interventions: joint mobilization, manual therapy, neuromuscular re-education, patient education, therapeutic activities, therapeutic exercise, strengthening and home exercise program  Frequency: 2x week  Duration in visits: 12  Duration in weeks: 6  Treatment plan discussed with: patient        Subjective Evaluation    History of Present Illness  Date of onset: 2023  Mechanism of injury: Pt reports a sudden onset of L radicular pain after bending down to pick something up 3 weeks ago.  Pt reports that pain radiates from her buttock to L shin. Pt was originally given a steroid pack which helped her pain. Pt reports overall improvements however still has a lot of pain at night and with prolonged sitting. X-rays were unremarkable. Pt presents to OP PT. Not a recurrent problem   Quality of life: good    Patient Goals  Patient goals for therapy: return to sport/leisure activities and decreased pain    Pain  Current pain ratin  At worst pain ratin  Location: L LE  Quality: dull ache  Aggravating factors: sitting  Progression: improved      Diagnostic Tests  X-ray: normal  Treatments  Previous treatment: medication        Objective     Active Range of Motion     Lumbar   Flexion:  WFL  Extension:  WFL and with pain  Left lateral flexion:  WFL and with pain  Right lateral flexion:  WFL  Left rotation:  WFL  Right rotation:  [facility]    Strength/Myotome Testing     Lumbar   Left   Normal strength    Right   Normal strength    Tests     Lumbar   Positive Gaenslen's. Left   Negative passive SLR and slump test.     Left Hip   Positive FADIR and long sit.      Additional Tests Details  (+) L post rot             Precautions: none      Manuals             MET for L post rot MD            Sacral rocking                                       Neuro Re-Ed             Palloff press             Tband rotation             Lumbar ext at wall                                                                  Ther Ex             TM             PPT with ball sq 10"x10            Bridging with ball 5"x15            R hip flexor stretch 3x30"            Sl clamshells             Prone press up with OP             Prone glute sq             Prone hip ext             Self sciatic nerve glide                                       Ther Activity

## 2023-12-12 ENCOUNTER — OFFICE VISIT (OUTPATIENT)
Dept: PHYSICAL THERAPY | Facility: CLINIC | Age: 43
End: 2023-12-12
Payer: COMMERCIAL

## 2023-12-12 DIAGNOSIS — M54.42 ACUTE LEFT-SIDED LOW BACK PAIN WITH LEFT-SIDED SCIATICA: Primary | ICD-10-CM

## 2023-12-12 PROCEDURE — 97110 THERAPEUTIC EXERCISES: CPT | Performed by: PHYSICAL THERAPIST

## 2023-12-12 PROCEDURE — 97140 MANUAL THERAPY 1/> REGIONS: CPT | Performed by: PHYSICAL THERAPIST

## 2023-12-12 NOTE — PROGRESS NOTES
Daily Note     Today's date: 2023  Patient name: Lima Madera  : 1980  MRN: [id number]  Referring provider: Stoney Krishna  Dx:   Encounter Diagnosis     ICD-10-CM    1. Acute left-sided low back pain with left-sided sciatica  M54.42                      Subjective: Pt reports she was pain free for 2 days after the IE but then pain onset again after doing a lot of walking over the weekend. Objective: See treatment diary below      Assessment: L post rotation noted and corrected by MET. Taught self correction with good understanding. Pt was pain free post session. Plan: Continue per plan of care.       Precautions: none      Manuals            MET for L post rot MD TUCKER           Sacral elisabeth TUCKER           Self taught MET position  MD                        Neuro Re-Ed             Palloff press             Tband rotation                                                                              Ther Ex             TM  6' with TA           PPT with ball sq 10"x10 10"x10           Bridging with ball 5"x15 5"x15           R hip flexor stretch 3x30"            Sl clamshells  BTB 5"x15           SL reverse clamshells  BTB 5"x15           Prone press up with OP  x15           Prone glute sq  VÍCTOR 5"x15           Prone hip ext  x15ea           Prone alt UE/LE             Quad fire hydrant             Quad donkey kick             Quad alt UE/LE             Self sciatic nerve glide             Lumbar ext at wall  x15                        Ther Activity

## 2023-12-14 ENCOUNTER — OFFICE VISIT (OUTPATIENT)
Dept: PHYSICAL THERAPY | Facility: CLINIC | Age: 43
End: 2023-12-14
Payer: COMMERCIAL

## 2023-12-14 DIAGNOSIS — M54.42 ACUTE LEFT-SIDED LOW BACK PAIN WITH LEFT-SIDED SCIATICA: Primary | ICD-10-CM

## 2023-12-14 PROCEDURE — 97110 THERAPEUTIC EXERCISES: CPT | Performed by: PHYSICAL THERAPIST

## 2023-12-14 PROCEDURE — 97140 MANUAL THERAPY 1/> REGIONS: CPT | Performed by: PHYSICAL THERAPIST

## 2023-12-14 NOTE — PROGRESS NOTES
Daily Note     Today's date: 2023  Patient name: Ernestina Stewart  : 1980  MRN: [id number]  Referring provider: Kem Cunningham  Dx:   Encounter Diagnosis     ICD-10-CM    1. Acute left-sided low back pain with left-sided sciatica  M54.42                      Subjective: Pt reports she felt good after last session however had increased pain last night and is feeling sharp pain in SI joint. Objective: See treatment diary below      Assessment: Pt had good tolerance to progression of program. Substantial L post rot corrected by MET and diminished pain. Plan: Continue per plan of care.       Precautions: none      Manuals           MET for L post rot MD MD TUCKER          Sacral elisabeth TUCKER           Self taught MET position  MD TUCKER                       Neuro Re-Ed             Palloff press             Tband rotation                                                                              Ther Ex             TM  6' with TA 6' w/TA          PPT with ball sq 10"x10 10"x10           Bridging with ball 5"x15 5"x15 5"X20          R hip flexor stretch 3x30"            Sl clamshells  BTB 5"x15 BTB 5"x20          SL reverse clamshells  BTB 5"x15 BTB 5"x20          Prone press up with OP  x15 x15          Prone glute sq  VÍCTOR 5"x15 VÍCTOR 5"x20          Prone hip ext  x15ea VÍCTOR x20 ea          Prone alt UE/LE   15ea          Quad fire hydrant             Quad donkey kick             Quad alt UE/LE             Self sciatic nerve glide             Lumbar ext at wall  x15                        Ther Activity

## 2023-12-19 ENCOUNTER — OFFICE VISIT (OUTPATIENT)
Dept: PHYSICAL THERAPY | Facility: CLINIC | Age: 43
End: 2023-12-19
Payer: COMMERCIAL

## 2023-12-19 DIAGNOSIS — M54.42 ACUTE LEFT-SIDED LOW BACK PAIN WITH LEFT-SIDED SCIATICA: Primary | ICD-10-CM

## 2023-12-19 DIAGNOSIS — Z30.41 ENCOUNTER FOR SURVEILLANCE OF CONTRACEPTIVE PILLS: Primary | ICD-10-CM

## 2023-12-19 PROCEDURE — 97112 NEUROMUSCULAR REEDUCATION: CPT | Performed by: PHYSICAL THERAPIST

## 2023-12-19 PROCEDURE — 97110 THERAPEUTIC EXERCISES: CPT | Performed by: PHYSICAL THERAPIST

## 2023-12-19 RX ORDER — DROSPIRENONE AND ETHINYL ESTRADIOL 0.02-3(28)
1 KIT ORAL DAILY
Qty: 84 TABLET | Refills: 1 | OUTPATIENT
Start: 2023-12-19

## 2023-12-19 NOTE — TELEPHONE ENCOUNTER
Scheduled for annual, on last week of pack and is scheduled for first week of January. Primary pharmacy confirmed. [phone] phone okay.

## 2023-12-19 NOTE — PROGRESS NOTES
"Daily Note     Today's date: 2023  Patient name: Felicia Roy  : 1980  MRN: [id number]  Referring provider: Izaiah Santillan,*  Dx:   Encounter Diagnosis     ICD-10-CM    1. Acute left-sided low back pain with left-sided sciatica  M54.42                      Subjective: Pt reports that she's been able to self correct twice with good relief.       Objective: See treatment diary below      Assessment: Pt had good tolerance to progression of stabilization program. No rotation noted today.       Plan: Continue per plan of care.      Precautions: none      Manuals          MET for L post rot MD MD MD Dalton rot         Sacral rocking  MD           Self taught MET position  MD TUCKER                       Neuro Re-Ed             Palloff press    GTB x15ea         Tband rotation                                                    SI belt edu    MD                      Ther Ex             TM  6' with TA 6' w/TA 8' w/TA         PPT with ball sq 10\"x10 10\"x10           Bridging with ball 5\"x15 5\"x15 5\"X20 5\"x20         R hip flexor stretch 3x30\"            Sl clamshells  BTB 5\"x15 BTB 5\"x20 BTB 5\"x20         SL reverse clamshells  BTB 5\"x15 BTB 5\"x20 BTB 5\"x20         Prone press up with OP  x15 x15          Prone glute sq  VÍCTOR 5\"x15 VÍCTOR 5\"x20          Prone hip ext  x15ea VÍCTOR x20 ea VÍCTOR x20 ea         Prone alt UE/LE   15ea 15 ea         Quad fire hydrant    5\"x15 ea         Quad donkey kick    X15 ea         Quad alt UE/LE    X15 ea         Self sciatic nerve glide             Lumbar ext at wall  x15                        Ther Activity                                                                                                                         "

## 2023-12-21 RX ORDER — DROSPIRENONE AND ETHINYL ESTRADIOL 0.02-3(28)
1 KIT ORAL DAILY
Qty: 28 TABLET | Refills: 0 | Status: SHIPPED | OUTPATIENT
Start: 2023-12-21

## 2023-12-21 NOTE — TELEPHONE ENCOUNTER
Patient requesting refill prior to yearly in first week of January as she will be about 2 weeks short on her OCP.     Refill pended and sent to Dr. Allred for approval.

## 2023-12-23 DIAGNOSIS — M54.42 ACUTE LEFT-SIDED LOW BACK PAIN WITH LEFT-SIDED SCIATICA: ICD-10-CM

## 2023-12-27 ENCOUNTER — APPOINTMENT (OUTPATIENT)
Dept: PHYSICAL THERAPY | Facility: CLINIC | Age: 43
End: 2023-12-27
Payer: COMMERCIAL

## 2023-12-27 RX ORDER — GABAPENTIN 300 MG/1
CAPSULE ORAL
Qty: 180 CAPSULE | Refills: 1 | Status: SHIPPED | OUTPATIENT
Start: 2023-12-27

## 2024-01-04 ENCOUNTER — ANNUAL EXAM (OUTPATIENT)
Age: 44
End: 2024-01-04
Payer: COMMERCIAL

## 2024-01-04 VITALS — WEIGHT: 191.4 LBS | HEIGHT: 67 IN | BODY MASS INDEX: 30.04 KG/M2

## 2024-01-04 DIAGNOSIS — Z01.419 ENCOUNTER FOR ANNUAL ROUTINE GYNECOLOGICAL EXAMINATION: Primary | ICD-10-CM

## 2024-01-04 DIAGNOSIS — Z12.31 ENCOUNTER FOR SCREENING MAMMOGRAM FOR MALIGNANT NEOPLASM OF BREAST: ICD-10-CM

## 2024-01-04 DIAGNOSIS — Z30.41 ENCOUNTER FOR SURVEILLANCE OF CONTRACEPTIVE PILLS: ICD-10-CM

## 2024-01-04 PROCEDURE — S0612 ANNUAL GYNECOLOGICAL EXAMINA: HCPCS | Performed by: OBSTETRICS & GYNECOLOGY

## 2024-01-04 RX ORDER — DROSPIRENONE AND ETHINYL ESTRADIOL 0.02-3(28)
1 KIT ORAL DAILY
Qty: 84 TABLET | Refills: 3 | Status: SHIPPED | OUTPATIENT
Start: 2024-01-04

## 2024-01-04 RX ORDER — CYCLOBENZAPRINE HCL 10 MG
TABLET ORAL
COMMUNITY
Start: 2023-11-17

## 2024-01-04 NOTE — PROGRESS NOTES
Assessment/Plan:    1. Encounter for annual routine gynecological examination      2. Encounter for screening mammogram for malignant neoplasm of breast    - Mammo screening bilateral w 3d & cad; Future    3. Encounter for surveillance of contraceptive pills    - drospirenone-ethinyl estradiol (Vestura) 3-0.02 MG per tablet; Take 1 tablet by mouth daily  Dispense: 84 tablet; Refill: 3      Subjective      Felicia Roy is a 43 y.o. female who presents for annual exam. Periods are regular on OCP.  She denies any breast, urinary or sexual concerns.      Current contraception: OCP (estrogen/progesterone)  History of abnormal Pap smear: no  Regular self breast exam: yes  History of abnormal mammogram: no  History of abnormal lipids: no    Menstrual History:  OB History          3    Para   2    Term   2       0    AB   1    Living   2         SAB   1    IAB   0    Ectopic        Multiple   0    Live Births   2           Obstetric Comments   Menarche - 14 yo                  No LMP recorded. (Menstrual status: Birth Control).  Period Duration (Days): 1  Menstrual Flow: Light    Past Medical History:   Diagnosis Date    Asthma     Last Assessed: 2018    Blood type, Rh negative     Kidney stones     Miscarriage 2007    Palpitations     Sciatica     Last Assessed: 12/10/2012    Shortness of breath     Last Assessed: 10/23/2012    SVT (supraventricular tachycardia)     Varicella        Family History   Problem Relation Age of Onset    Diabetes Mother     Hypertension Mother     Thyroid disease Mother         hypo    Hypothyroidism Mother     Diabetes Father     Kidney cancer Father     Graves' disease Father     Hyperthyroidism Father     Cancer Father         Renal carcinoma, kidney removed    Heart attack Father         2019, survived    Thyroid disease Father         hyper    Prostate cancer Brother     Skin cancer Maternal Grandfather     Prostate cancer Maternal Grandfather     Heart  "attack Paternal Grandfather     Stroke Other         CVA    No Known Problems Sister     No Known Problems Daughter     No Known Problems Maternal Grandmother     No Known Problems Paternal Grandmother     Colon cancer Cousin 40       The following portions of the patient's history were reviewed and updated as appropriate: allergies, current medications, past family history, past medical history, past social history, past surgical history, and problem list.    Review of Systems  Pertinent items are noted in HPI.      Objective      Ht 5' 6.5\" (1.689 m)   Wt 86.8 kg (191 lb 6.4 oz)   BMI 30.43 kg/m²     General:   alert and oriented, in no acute distress   Heart:  Breasts: regular rate and rhythm  appear normal, no suspicious masses, no skin or nipple changes or axillary nodes.   Lungs: Effort normal   Abdomen: soft, non-tender, without masses or organomegaly   Vulva: normal   Vagina: normal mucosa   Cervix: no lesions   Uterus: normal size, mobile, non-tender   Adnexa: normal adnexa and no mass, fullness, tenderness               "

## 2024-02-01 ENCOUNTER — RA CDI HCC (OUTPATIENT)
Dept: OTHER | Facility: HOSPITAL | Age: 44
End: 2024-02-01

## 2024-02-01 PROBLEM — Z01.419 ENCOUNTER FOR GYNECOLOGICAL EXAMINATION WITHOUT ABNORMAL FINDING: Status: RESOLVED | Noted: 2018-04-04 | Resolved: 2024-02-01

## 2024-02-01 PROBLEM — Z00.00 WELLNESS EXAMINATION: Status: RESOLVED | Noted: 2020-08-10 | Resolved: 2024-02-01

## 2024-02-01 PROBLEM — R68.89 FLU-LIKE SYMPTOMS: Status: RESOLVED | Noted: 2021-12-28 | Resolved: 2024-02-01

## 2024-02-02 ENCOUNTER — OFFICE VISIT (OUTPATIENT)
Dept: INTERNAL MEDICINE CLINIC | Facility: CLINIC | Age: 44
End: 2024-02-02
Payer: COMMERCIAL

## 2024-02-02 VITALS
WEIGHT: 193.8 LBS | OXYGEN SATURATION: 99 % | BODY MASS INDEX: 28.71 KG/M2 | HEART RATE: 74 BPM | HEIGHT: 69 IN | SYSTOLIC BLOOD PRESSURE: 132 MMHG | DIASTOLIC BLOOD PRESSURE: 58 MMHG

## 2024-02-02 DIAGNOSIS — Z13.29 SCREENING FOR THYROID DISORDER: ICD-10-CM

## 2024-02-02 DIAGNOSIS — E66.3 OVERWEIGHT (BMI 25.0-29.9): ICD-10-CM

## 2024-02-02 DIAGNOSIS — Z11.59 NEED FOR HEPATITIS C SCREENING TEST: Primary | ICD-10-CM

## 2024-02-02 DIAGNOSIS — J32.0 CHRONIC MAXILLARY SINUSITIS: ICD-10-CM

## 2024-02-02 DIAGNOSIS — Z23 ENCOUNTER FOR IMMUNIZATION: ICD-10-CM

## 2024-02-02 DIAGNOSIS — Z00.00 WELLNESS EXAMINATION: ICD-10-CM

## 2024-02-02 DIAGNOSIS — Z13.1 SCREENING FOR DIABETES MELLITUS: ICD-10-CM

## 2024-02-02 DIAGNOSIS — Z13.6 SCREENING FOR CARDIOVASCULAR CONDITION: ICD-10-CM

## 2024-02-02 DIAGNOSIS — R79.89 ELEVATED CORTISOL LEVEL: ICD-10-CM

## 2024-02-02 DIAGNOSIS — Z13.0 SCREENING, ANEMIA, DEFICIENCY, IRON: ICD-10-CM

## 2024-02-02 PROCEDURE — 90471 IMMUNIZATION ADMIN: CPT

## 2024-02-02 PROCEDURE — 99212 OFFICE O/P EST SF 10 MIN: CPT | Performed by: INTERNAL MEDICINE

## 2024-02-02 PROCEDURE — 99396 PREV VISIT EST AGE 40-64: CPT | Performed by: INTERNAL MEDICINE

## 2024-02-02 PROCEDURE — 90686 IIV4 VACC NO PRSV 0.5 ML IM: CPT

## 2024-02-02 NOTE — PROGRESS NOTES
ADULT ANNUAL PHYSICAL  [facility] - [facility]    NAME: Felicia Roy  AGE: 43 y.o. SEX: female  : 1980     DATE: 2024     Assessment and Plan:     Problem List Items Addressed This Visit        Respiratory    Chronic maxillary sinusitis     Patient does report to me chronic congestion and sinus pressure no signs or symptoms of an active infection previous MRI does show chronic sinus disease at this point in time would like the patient to see ENT for consideration of nasopharyngeal scope and if any further interventions will be helpful  Continue Flonase/antihistamine/Allegra         Relevant Orders    Ambulatory Referral to Otolaryngology       Other    Wellness examination     Assessment and plan 1.  Health maintenance annual wellness examination overall the patient is clinically stable and doing well, we encouraged the patient to follow a healthy and balanced diet.  We recommend that the patient exercise routinely approximately 30 minutes 5 times per week .  We have reviewed the patient's vaccines and have made recommendations for updates if necessary annual flu shot   .  We will be ordering screening laboratories which are age appropriate.  Return to the office in   1 year   call if any problems.         Overweight (BMI 25.0-29.9)     I have counselled the pt to follow a healthy and balanced diet ,and recommend routine exercise.         Elevated cortisol level     Elevated fasting cortisol but 24-hour collection cortisol normal reviewed labs        Other Visit Diagnoses     Need for hepatitis C screening test    -  Primary    Relevant Orders    Hepatitis C Antibody    Encounter for immunization        Relevant Orders    influenza vaccine, quadrivalent, 0.5 mL, preservative-free, for adult and pediatric patients 6 mos+ (AFLURIA, FLUARIX, FLULAVAL, FLUZONE) (Completed)    Screening for cardiovascular condition        Relevant Orders    Comprehensive  metabolic panel    Lipid Panel with Direct LDL reflex    Screening for diabetes mellitus        Relevant Orders    Hemoglobin A1C    Screening for thyroid disorder        Relevant Orders    TSH, 3rd generation with Free T4 reflex    Screening, anemia, deficiency, iron        Relevant Orders    CBC (Includes Diff/Plt) (Refl)      RTO in 1 year call if any problems    Immunizations and preventive care screenings were discussed with patient today. Appropriate education was printed on patient's after visit summary.    Counseling:  Exercise: the importance of regular exercise/physical activity was discussed. Recommend exercise 3-5 times per week for at least 30 minutes.       Depression Screening and Follow-up Plan: Patient was screened for depression during today's encounter. They screened negative with a PHQ-9 score of 0.        Return in about 1 year (around 2/2/2025).     Chief Complaint:     Chief Complaint   Patient presents with   • Physical Exam      History of Present Illness:     Adult Annual Physical   Patient here for a comprehensive physical exam.  43-year old female coming in for a follow up office visit regarding chronic sinusitis, overweight and elevated cortisol level; the patient reports me compliant taking medications without untoward side effects the.  The patient is here to review his medical condition, update me on the medical condition and the patient reports me no hospitalizations and no ER visits.  No injuries no illnesses overall doing very well patient is exercising routinely and following a healthy and well-balanced diet we did review previous laboratories from last year showing a mild elevation of the cortisol level but a 24-hour collection of urine was normal for cortisol.  Patient does report to me chronic sinus pressure no pain no fever no chills no discolored mucus discharge.  Previous MRI had showed chronic sinus disease we did review this report.  Patient has been using Flonase and  Allegra but having persistent symptoms at this point time she would like further evaluation and treatment.    Diet and Physical Activity  Diet/Nutrition: well balanced diet.   Exercise: vigorous cardiovascular exercise.      Depression Screening  PHQ-2/9 Depression Screening    Little interest or pleasure in doing things: 0 - not at all  Feeling down, depressed, or hopeless: 0 - not at all  Trouble falling or staying asleep, or sleeping too much: 0 - not at all  Feeling tired or having little energy: 0 - not at all  Poor appetite or overeatin - not at all  Feeling bad about yourself - or that you are a failure or have let yourself or your family down: 0 - not at all  Trouble concentrating on things, such as reading the newspaper or watching television: 0 - not at all  Moving or speaking so slowly that other people could have noticed. Or the opposite - being so fidgety or restless that you have been moving around a lot more than usual: 0 - not at all  Thoughts that you would be better off dead, or of hurting yourself in some way: 0 - not at all  PHQ-9 Score: 0  PHQ-9 Interpretation: No or Minimal depression       General Health  Sleep: sleeps well. 7  Hearing: normal - bilateral.  Vision: no vision problems.   Dental: regular dental visits.       /GYN Health  Follows with gynecology? yes   Patient is: premenopausal mild   Last menstrual period: 2 weeks  Contraceptive method: oral contraceptives.    Advanced Care Planning  Do you have an advanced directive? no  Do you have a durable medical power of ? no     Review of Systems:     Review of Systems   Constitutional:  Negative for activity change, appetite change and unexpected weight change.   HENT:  Positive for congestion and sinus pressure. Negative for postnasal drip and sinus pain.    Eyes:  Negative for visual disturbance.   Respiratory:  Negative for cough and shortness of breath.    Cardiovascular:  Negative for chest pain.   Gastrointestinal:   Negative for abdominal pain, diarrhea, nausea and vomiting.   Neurological:  Negative for dizziness, light-headedness and headaches.   Hematological:  Negative for adenopathy.      Past Medical History:     Past Medical History:   Diagnosis Date   • Asthma     Last Assessed: 1/17/2018   • Blood type, Rh negative    • Encounter for gynecological examination without abnormal finding    • Flu-like symptoms    • Headache    • Kidney stones    • Miscarriage August 2007   • Palpitations    • Sciatica     Last Assessed: 12/10/2012   • Shortness of breath     Last Assessed: 10/23/2012   • SVT (supraventricular tachycardia)    • Varicella 1990   • Wellness examination       Past Surgical History:     Past Surgical History:   Procedure Laterality Date   • ATRIAL ABLATION SURGERY      Supraventricular Tachycardia   • CHOLECYSTECTOMY      2006   • TOOTH EXTRACTION        Social History:     Social History     Socioeconomic History   • Marital status: /Civil Union     Spouse name: None   • Number of children: None   • Years of education: None   • Highest education level: None   Occupational History   • Occupation:    Tobacco Use   • Smoking status: Never   • Smokeless tobacco: Never   Vaping Use   • Vaping status: Never Used   Substance and Sexual Activity   • Alcohol use: Yes     Alcohol/week: 5.0 standard drinks of alcohol     Types: 3 Cans of beer, 2 Standard drinks or equivalent per week     Comment: Social    • Drug use: No   • Sexual activity: Yes     Partners: Male     Birth control/protection: Pill     Comment: Pill   Other Topics Concern   • None   Social History Narrative    Always uses seat belt     Social Determinants of Health     Financial Resource Strain: Not on file   Food Insecurity: Not on file   Transportation Needs: Not on file   Physical Activity: Sufficiently Active (8/10/2023)    Exercise Vital Sign    • Days of Exercise per Week: 6 days    • Minutes of Exercise per Session: 30 min  "  Stress: Not on file   Social Connections: Not on file   Intimate Partner Violence: Not on file   Housing Stability: Not on file      Family History:     Family History   Problem Relation Age of Onset   • Diabetes Mother    • Hypertension Mother    • Thyroid disease Mother         hypo   • Hypothyroidism Mother    • Diabetes Father    • Kidney cancer Father    • Graves' disease Father    • Hyperthyroidism Father    • Cancer Father         Renal carcinoma, kidney removed   • Heart attack Father         2019, survived   • Thyroid disease Father         hyper   • Prostate cancer Brother    • Skin cancer Maternal Grandfather    • Prostate cancer Maternal Grandfather    • Heart attack Paternal Grandfather    • Stroke Other         CVA   • No Known Problems Sister    • No Known Problems Daughter    • No Known Problems Maternal Grandmother    • No Known Problems Paternal Grandmother    • Colon cancer Cousin 40      Current Medications:     Current Outpatient Medications   Medication Sig Dispense Refill   • albuterol (PROVENTIL HFA,VENTOLIN HFA) 90 mcg/act inhaler Inhale 1-2 puffs     • Biotin 5000 MCG CAPS Take 1 capsule by mouth daily     • drospirenone-ethinyl estradiol (Vestura) 3-0.02 MG per tablet Take 1 tablet by mouth daily 84 tablet 3   • Fexofenadine HCl (ALLEGRA PO) Take 1 tablet by mouth daily     • fluticasone (FLONASE) 50 mcg/act nasal spray 1 spray into each nostril daily     • Probiotic Product (PROBIOTIC DAILY PO) Take by mouth       No current facility-administered medications for this visit.      Allergies:     Allergies   Allergen Reactions   • Cat Hair Extract    • Dust Mite Extract    • Molds & Smuts    • Penicillins Rash      Physical Exam:     /58   Pulse 74   Ht 5' 8.9\" (1.75 m)   Wt 87.9 kg (193 lb 12.8 oz)   SpO2 99%   BMI 28.70 kg/m²   No reproducible sinus tenderness  Physical Exam  Vitals and nursing note reviewed.   Constitutional:       General: She is not in acute distress.     " Appearance: Normal appearance. She is well-developed. She is not ill-appearing, toxic-appearing or diaphoretic.   HENT:      Head: Normocephalic and atraumatic.      Right Ear: External ear normal.      Left Ear: External ear normal.      Nose: Nose normal.   Eyes:      Pupils: Pupils are equal, round, and reactive to light.   Cardiovascular:      Rate and Rhythm: Normal rate and regular rhythm.      Heart sounds: Normal heart sounds. No murmur heard.  Pulmonary:      Effort: Pulmonary effort is normal.      Breath sounds: Normal breath sounds.   Abdominal:      General: There is no distension.      Palpations: Abdomen is soft.      Tenderness: There is no abdominal tenderness. There is no guarding.   Neurological:      Mental Status: She is alert.   Psychiatric:         Mood and Affect: Mood is not anxious or depressed.         Thought Content: Thought content does not include suicidal ideation.          Dandy Delgado DO  [facility]

## 2024-02-04 PROBLEM — J32.0 CHRONIC MAXILLARY SINUSITIS: Status: ACTIVE | Noted: 2024-02-04

## 2024-02-04 NOTE — ASSESSMENT & PLAN NOTE
Assessment and plan 1.  Health maintenance annual wellness examination overall the patient is clinically stable and doing well, we encouraged the patient to follow a healthy and balanced diet.  We recommend that the patient exercise routinely approximately 30 minutes 5 times per week .  We have reviewed the patient's vaccines and have made recommendations for updates if necessary annual flu shot   .  We will be ordering screening laboratories which are age appropriate.  Return to the office in   1 year   call if any problems.

## 2024-02-04 NOTE — ASSESSMENT & PLAN NOTE
Patient does report to me chronic congestion and sinus pressure no signs or symptoms of an active infection previous MRI does show chronic sinus disease at this point in time would like the patient to see ENT for consideration of nasopharyngeal scope and if any further interventions will be helpful  Continue Flonase/antihistamine/Allegra

## 2024-10-04 ENCOUNTER — HOSPITAL ENCOUNTER (OUTPATIENT)
Dept: MAMMOGRAPHY | Facility: IMAGING CENTER | Age: 44
Discharge: HOME | End: 2024-10-04
Payer: COMMERCIAL

## 2024-10-04 VITALS — WEIGHT: 185 LBS | BODY MASS INDEX: 27.4 KG/M2 | HEIGHT: 69 IN

## 2024-10-04 DIAGNOSIS — Z12.31 ENCOUNTER FOR SCREENING MAMMOGRAM FOR MALIGNANT NEOPLASM OF BREAST: ICD-10-CM

## 2024-10-04 PROCEDURE — 77067 SCR MAMMO BI INCL CAD: CPT

## 2024-10-04 PROCEDURE — 77063 BREAST TOMOSYNTHESIS BI: CPT

## 2024-12-20 DIAGNOSIS — Z30.41 ENCOUNTER FOR SURVEILLANCE OF CONTRACEPTIVE PILLS: ICD-10-CM

## 2024-12-20 RX ORDER — DROSPIRENONE AND ETHINYL ESTRADIOL 0.02-3(28)
1 KIT ORAL DAILY
Qty: 28 TABLET | Refills: 0 | Status: SHIPPED | OUTPATIENT
Start: 2024-12-20

## 2025-01-13 DIAGNOSIS — Z30.41 ENCOUNTER FOR SURVEILLANCE OF CONTRACEPTIVE PILLS: ICD-10-CM

## 2025-01-15 RX ORDER — DROSPIRENONE AND ETHINYL ESTRADIOL 0.02-3(28)
1 KIT ORAL DAILY
Qty: 28 TABLET | Refills: 0 | Status: SHIPPED | OUTPATIENT
Start: 2025-01-15

## 2025-02-03 ENCOUNTER — RA CDI HCC (OUTPATIENT)
Dept: OTHER | Facility: HOSPITAL | Age: 45
End: 2025-02-03

## 2025-02-04 ENCOUNTER — TELEPHONE (OUTPATIENT)
Dept: INTERNAL MEDICINE CLINIC | Facility: CLINIC | Age: 45
End: 2025-02-04

## 2025-02-04 NOTE — TELEPHONE ENCOUNTER
LM to CB to reschedule cancelled physical appt, per pt CB    Appointment canceled for Felicia Roy ([id number])Visit type: PHYSICAL [facility]2/4/2025 12:40 PM (40 minutes) with Dandy Delgado DO in [facility] for cancellation: Canceled via MyChartPatient comments: My boss just scheduled a job candidate interview and I must attend.  Please call me to reschedule. My chart won't allow me to.

## 2025-02-11 DIAGNOSIS — Z30.41 ENCOUNTER FOR SURVEILLANCE OF CONTRACEPTIVE PILLS: ICD-10-CM

## 2025-02-12 RX ORDER — DROSPIRENONE AND ETHINYL ESTRADIOL 0.02-3(28)
1 KIT ORAL DAILY
Qty: 28 TABLET | Refills: 0 | Status: SHIPPED | OUTPATIENT
Start: 2025-02-12

## 2025-03-04 ENCOUNTER — OFFICE VISIT (OUTPATIENT)
Age: 45
End: 2025-03-04
Payer: COMMERCIAL

## 2025-03-04 VITALS
BODY MASS INDEX: 28.58 KG/M2 | DIASTOLIC BLOOD PRESSURE: 82 MMHG | WEIGHT: 193 LBS | HEIGHT: 69 IN | SYSTOLIC BLOOD PRESSURE: 122 MMHG

## 2025-03-04 DIAGNOSIS — Z01.419 ENCOUNTER FOR ANNUAL ROUTINE GYNECOLOGICAL EXAMINATION: Primary | ICD-10-CM

## 2025-03-04 DIAGNOSIS — Z12.31 ENCOUNTER FOR SCREENING MAMMOGRAM FOR MALIGNANT NEOPLASM OF BREAST: ICD-10-CM

## 2025-03-04 DIAGNOSIS — Z30.41 ENCOUNTER FOR SURVEILLANCE OF CONTRACEPTIVE PILLS: ICD-10-CM

## 2025-03-04 PROCEDURE — S0612 ANNUAL GYNECOLOGICAL EXAMINA: HCPCS | Performed by: OBSTETRICS & GYNECOLOGY

## 2025-03-04 RX ORDER — DROSPIRENONE AND ETHINYL ESTRADIOL 0.02-3(28)
1 KIT ORAL DAILY
Qty: 84 TABLET | Refills: 3 | Status: SHIPPED | OUTPATIENT
Start: 2025-03-04

## 2025-03-04 RX ORDER — AZITHROMYCIN 250 MG/1
TABLET, FILM COATED ORAL
COMMUNITY
Start: 2025-02-28

## 2025-03-04 RX ORDER — PREDNISONE 20 MG/1
TABLET ORAL
COMMUNITY
Start: 2025-02-28

## 2025-03-04 NOTE — PROGRESS NOTES
"Assessment/Plan:     1. Encounter for annual routine gynecological examination (Primary)      2. Encounter for screening mammogram for malignant neoplasm of breast    - Mammo screening bilateral w 3d and cad; Future    3. Encounter for surveillance of contraceptive pills    - drospirenone-ethinyl estradiol (Vestura) 3-0.02 MG per tablet; Take 1 tablet by mouth daily  Dispense: 84 tablet; Refill: 3    Subjective      Felicia Roy is a 44 y.o. female who presents for annual exam. Periods are mostly absent on OCP.  She reports some HS-type \"pimples\" in groin area periodically.  No breast, urinary or sexual concerns.    Current contraception: OCP (estrogen/progesterone)  History of abnormal Pap smear: no  Regular self breast exam: yes  History of abnormal mammogram: no  History of abnormal lipids: no    Menstrual History:  OB History          3    Para   2    Term   2       0    AB   1    Living   2         SAB   1    IAB   0    Ectopic   0    Multiple   0    Live Births   2       Menarche age: 13  No LMP recorded. (Menstrual status: Birth Control).  Period Cycle (Days): 2  Period Pattern: (!) Irregular  Menstrual Flow: Light  Dysmenorrhea: None    Past Medical History:   Diagnosis Date    Asthma     Last Assessed: 2018    Blood type, Rh negative     Encounter for gynecological examination without abnormal finding 2018    Flu-like symptoms 2021    Headache 2016    Kidney stones     Miscarriage 2007    Palpitations     Sciatica     Last Assessed: 12/10/2012    Shortness of breath     Last Assessed: 10/23/2012    SVT (supraventricular tachycardia) (HCC)     Varicella     Wellness examination 8/10/2020       Family History   Problem Relation Age of Onset    Diabetes Mother     Hypertension Mother     Thyroid disease Mother         hypo    Hypothyroidism Mother     Alzheimer's disease Mother     Diabetes Father     Kidney cancer Father 70    Graves' disease Father     " "Hyperthyroidism Father     Heart attack Father         2019, survived    Thyroid disease Father         hyper    No Known Problems Sister     No Known Problems Daughter     No Known Problems Maternal Grandmother     Skin cancer Maternal Grandfather         Multiple x-80's or 90's    Prostate cancer Maternal Grandfather         80's    No Known Problems Paternal Grandmother     Heart attack Paternal Grandfather     Colon cancer Cousin 40    Stroke Other         CVA       The following portions of the patient's history were reviewed and updated as appropriate: allergies, current medications, past family history, past medical history, past social history, past surgical history, and problem list.    Review of Systems  Pertinent items are noted in HPI.      Objective      /82 (BP Location: Right arm, Patient Position: Sitting, Cuff Size: Large)   Ht 5' 9\" (1.753 m)   Wt 87.5 kg (193 lb)   BMI 28.50 kg/m²     General:   alert and oriented, in no acute distress   Heart:  Breasts: regular rate and rhythm  appear normal, no suspicious masses, no skin or nipple changes or axillary nodes.   Lungs: Effort normal   Abdomen: soft, non-tender, without masses or organomegaly   Vulva: normal   Vagina: normal mucosa   Cervix: no lesions   Uterus: normal size, mobile, non-tender   Adnexa:  Bladder: normal adnexa and no mass, fullness, tenderness  Non-tender, no prolapse               "